# Patient Record
Sex: FEMALE | Race: WHITE | NOT HISPANIC OR LATINO | Employment: OTHER | URBAN - METROPOLITAN AREA
[De-identification: names, ages, dates, MRNs, and addresses within clinical notes are randomized per-mention and may not be internally consistent; named-entity substitution may affect disease eponyms.]

---

## 2021-05-18 ENCOUNTER — HOSPITAL ENCOUNTER (EMERGENCY)
Facility: HOSPITAL | Age: 71
Discharge: HOME | End: 2021-05-18
Attending: EMERGENCY MEDICINE
Payer: MEDICARE

## 2021-05-18 VITALS
OXYGEN SATURATION: 94 % | TEMPERATURE: 98 F | DIASTOLIC BLOOD PRESSURE: 65 MMHG | RESPIRATION RATE: 20 BRPM | WEIGHT: 285 LBS | HEART RATE: 20 BPM | BODY MASS INDEX: 52.13 KG/M2 | SYSTOLIC BLOOD PRESSURE: 150 MMHG

## 2021-05-18 DIAGNOSIS — R06.02 SOB (SHORTNESS OF BREATH): ICD-10-CM

## 2021-05-18 DIAGNOSIS — J44.1 COPD WITH ACUTE EXACERBATION: ICD-10-CM

## 2021-05-18 DIAGNOSIS — J18.9 PNEUMONIA OF RIGHT MIDDLE LOBE DUE TO INFECTIOUS ORGANISM: Primary | ICD-10-CM

## 2021-05-18 LAB
ALBUMIN SERPL BCP-MCNC: 3.1 G/DL (ref 3.5–5.2)
ALP SERPL-CCNC: 134 U/L (ref 55–135)
ALT SERPL W/O P-5'-P-CCNC: 14 U/L (ref 10–44)
ANION GAP SERPL CALC-SCNC: 14 MMOL/L (ref 8–16)
AST SERPL-CCNC: 12 U/L (ref 10–40)
BASOPHILS # BLD AUTO: 0.02 K/UL (ref 0–0.2)
BASOPHILS NFR BLD: 0.2 % (ref 0–1.9)
BILIRUB SERPL-MCNC: 0.3 MG/DL (ref 0.1–1)
BNP SERPL-MCNC: 23 PG/ML (ref 0–99)
BUN SERPL-MCNC: 17 MG/DL (ref 8–23)
CALCIUM SERPL-MCNC: 8.5 MG/DL (ref 8.7–10.5)
CHLORIDE SERPL-SCNC: 94 MMOL/L (ref 95–110)
CO2 SERPL-SCNC: 23 MMOL/L (ref 23–29)
CREAT SERPL-MCNC: 1.6 MG/DL (ref 0.5–1.4)
CTP QC/QA: YES
DIFFERENTIAL METHOD: ABNORMAL
EOSINOPHIL # BLD AUTO: 0.2 K/UL (ref 0–0.5)
EOSINOPHIL NFR BLD: 1.9 % (ref 0–8)
ERYTHROCYTE [DISTWIDTH] IN BLOOD BY AUTOMATED COUNT: 13.2 % (ref 11.5–14.5)
EST. GFR  (AFRICAN AMERICAN): 37.6 ML/MIN/1.73 M^2
EST. GFR  (NON AFRICAN AMERICAN): 32.6 ML/MIN/1.73 M^2
GLUCOSE SERPL-MCNC: 352 MG/DL (ref 70–110)
HCT VFR BLD AUTO: 37.2 % (ref 37–48.5)
HGB BLD-MCNC: 12.2 G/DL (ref 12–16)
IMM GRANULOCYTES # BLD AUTO: 0.05 K/UL (ref 0–0.04)
IMM GRANULOCYTES NFR BLD AUTO: 0.5 % (ref 0–0.5)
LYMPHOCYTES # BLD AUTO: 1.3 K/UL (ref 1–4.8)
LYMPHOCYTES NFR BLD: 12.2 % (ref 18–48)
MCH RBC QN AUTO: 33.5 PG (ref 27–31)
MCHC RBC AUTO-ENTMCNC: 32.8 G/DL (ref 32–36)
MCV RBC AUTO: 102 FL (ref 82–98)
MONOCYTES # BLD AUTO: 0.6 K/UL (ref 0.3–1)
MONOCYTES NFR BLD: 5.5 % (ref 4–15)
NEUTROPHILS # BLD AUTO: 8.2 K/UL (ref 1.8–7.7)
NEUTROPHILS NFR BLD: 79.7 % (ref 38–73)
NRBC BLD-RTO: 0 /100 WBC
PLATELET # BLD AUTO: 198 K/UL (ref 150–450)
PMV BLD AUTO: 9.8 FL (ref 9.2–12.9)
POCT GLUCOSE: 361 MG/DL (ref 70–110)
POCT GLUCOSE: 377 MG/DL (ref 70–110)
POTASSIUM SERPL-SCNC: 4.9 MMOL/L (ref 3.5–5.1)
PROT SERPL-MCNC: 7.3 G/DL (ref 6–8.4)
RBC # BLD AUTO: 3.64 M/UL (ref 4–5.4)
SARS-COV-2 RDRP RESP QL NAA+PROBE: NEGATIVE
SODIUM SERPL-SCNC: 131 MMOL/L (ref 136–145)
TROPONIN I SERPL DL<=0.01 NG/ML-MCNC: 0.02 NG/ML (ref 0–0.03)
TROPONIN I SERPL DL<=0.01 NG/ML-MCNC: 0.02 NG/ML (ref 0–0.03)
WBC # BLD AUTO: 10.25 K/UL (ref 3.9–12.7)

## 2021-05-18 PROCEDURE — 84484 ASSAY OF TROPONIN QUANT: CPT | Mod: ER | Performed by: EMERGENCY MEDICINE

## 2021-05-18 PROCEDURE — 83880 ASSAY OF NATRIURETIC PEPTIDE: CPT | Mod: ER | Performed by: EMERGENCY MEDICINE

## 2021-05-18 PROCEDURE — 93010 ELECTROCARDIOGRAM REPORT: CPT | Mod: ,,, | Performed by: INTERNAL MEDICINE

## 2021-05-18 PROCEDURE — 99285 EMERGENCY DEPT VISIT HI MDM: CPT | Mod: 25,ER

## 2021-05-18 PROCEDURE — 93005 ELECTROCARDIOGRAM TRACING: CPT | Mod: ER

## 2021-05-18 PROCEDURE — 96365 THER/PROPH/DIAG IV INF INIT: CPT | Mod: ER

## 2021-05-18 PROCEDURE — 27000221 HC OXYGEN, UP TO 24 HOURS: Mod: ER

## 2021-05-18 PROCEDURE — 25000003 PHARM REV CODE 250: Mod: ER | Performed by: EMERGENCY MEDICINE

## 2021-05-18 PROCEDURE — 63600175 PHARM REV CODE 636 W HCPCS: Mod: ER | Performed by: EMERGENCY MEDICINE

## 2021-05-18 PROCEDURE — 96372 THER/PROPH/DIAG INJ SC/IM: CPT | Mod: 59,ER

## 2021-05-18 PROCEDURE — U0002 COVID-19 LAB TEST NON-CDC: HCPCS | Mod: ER | Performed by: EMERGENCY MEDICINE

## 2021-05-18 PROCEDURE — 87040 BLOOD CULTURE FOR BACTERIA: CPT | Mod: 59 | Performed by: EMERGENCY MEDICINE

## 2021-05-18 PROCEDURE — 94640 AIRWAY INHALATION TREATMENT: CPT | Mod: ER

## 2021-05-18 PROCEDURE — 85025 COMPLETE CBC W/AUTO DIFF WBC: CPT | Mod: ER | Performed by: EMERGENCY MEDICINE

## 2021-05-18 PROCEDURE — 25000242 PHARM REV CODE 250 ALT 637 W/ HCPCS: Mod: ER | Performed by: EMERGENCY MEDICINE

## 2021-05-18 PROCEDURE — 82962 GLUCOSE BLOOD TEST: CPT | Mod: ER

## 2021-05-18 PROCEDURE — 93010 EKG 12-LEAD: ICD-10-PCS | Mod: ,,, | Performed by: INTERNAL MEDICINE

## 2021-05-18 PROCEDURE — 80053 COMPREHEN METABOLIC PANEL: CPT | Mod: ER | Performed by: EMERGENCY MEDICINE

## 2021-05-18 RX ORDER — AZITHROMYCIN 250 MG/1
TABLET, FILM COATED ORAL
Qty: 6 TABLET | Refills: 0 | Status: SHIPPED | OUTPATIENT
Start: 2021-05-18

## 2021-05-18 RX ORDER — IPRATROPIUM BROMIDE AND ALBUTEROL SULFATE 2.5; .5 MG/3ML; MG/3ML
3 SOLUTION RESPIRATORY (INHALATION)
Status: COMPLETED | OUTPATIENT
Start: 2021-05-18 | End: 2021-05-18

## 2021-05-18 RX ORDER — AMOXICILLIN AND CLAVULANATE POTASSIUM 875; 125 MG/1; MG/1
1 TABLET, FILM COATED ORAL 2 TIMES DAILY
Qty: 14 TABLET | Refills: 0 | Status: SHIPPED | OUTPATIENT
Start: 2021-05-18

## 2021-05-18 RX ORDER — INSULIN ASPART 100 [IU]/ML
8 INJECTION, SOLUTION INTRAVENOUS; SUBCUTANEOUS
Status: COMPLETED | OUTPATIENT
Start: 2021-05-18 | End: 2021-05-18

## 2021-05-18 RX ADMIN — IPRATROPIUM BROMIDE AND ALBUTEROL SULFATE 3 ML: .5; 3 SOLUTION RESPIRATORY (INHALATION) at 04:05

## 2021-05-18 RX ADMIN — INSULIN ASPART 8 UNITS: 100 INJECTION, SOLUTION INTRAVENOUS; SUBCUTANEOUS at 06:05

## 2021-05-18 RX ADMIN — CEFTRIAXONE 1 G: 1 INJECTION, SOLUTION INTRAVENOUS at 06:05

## 2021-05-18 RX ADMIN — SODIUM CHLORIDE 250 ML: 0.9 INJECTION, SOLUTION INTRAVENOUS at 06:05

## 2021-05-24 LAB
BACTERIA BLD CULT: NORMAL
BACTERIA BLD CULT: NORMAL

## 2021-06-10 DIAGNOSIS — L97.412 DIABETIC ULCER OF RIGHT HEEL ASSOCIATED WITH TYPE 2 DIABETES MELLITUS, WITH FAT LAYER EXPOSED: Primary | ICD-10-CM

## 2021-06-10 DIAGNOSIS — E11.621 DIABETIC ULCER OF RIGHT HEEL ASSOCIATED WITH TYPE 2 DIABETES MELLITUS, WITH FAT LAYER EXPOSED: Primary | ICD-10-CM

## 2021-07-06 ENCOUNTER — LAB VISIT (OUTPATIENT)
Dept: LAB | Facility: HOSPITAL | Age: 71
End: 2021-07-06
Attending: INTERNAL MEDICINE
Payer: MEDICARE

## 2021-07-06 DIAGNOSIS — L02.93: Primary | ICD-10-CM

## 2021-07-06 PROCEDURE — 87070 CULTURE OTHR SPECIMN AEROBIC: CPT | Performed by: INTERNAL MEDICINE

## 2021-07-10 LAB — BACTERIA SPEC AEROBE CULT: NO GROWTH

## 2023-10-03 ENCOUNTER — HOSPITAL ENCOUNTER (OUTPATIENT)
Dept: RADIOLOGY | Facility: HOSPITAL | Age: 73
Discharge: HOME | End: 2023-10-03
Attending: INTERNAL MEDICINE
Payer: MEDICARE

## 2023-10-03 DIAGNOSIS — M79.604 RIGHT LEG PAIN: Primary | ICD-10-CM

## 2023-10-03 DIAGNOSIS — M79.604 RIGHT LEG PAIN: ICD-10-CM

## 2023-10-03 PROCEDURE — 93971 EXTREMITY STUDY: CPT | Mod: TC,PO,RT

## 2023-10-03 PROCEDURE — 93971 EXTREMITY STUDY: CPT | Mod: 26,RT,, | Performed by: RADIOLOGY

## 2023-10-03 PROCEDURE — 93971 US LOWER EXTREMITY VEINS RIGHT: ICD-10-PCS | Mod: 26,RT,, | Performed by: RADIOLOGY

## 2024-08-16 ENCOUNTER — HOSPITAL ENCOUNTER (INPATIENT)
Facility: HOSPITAL | Age: 74
LOS: 3 days | Discharge: HOME | DRG: 178 | End: 2024-08-19
Attending: EMERGENCY MEDICINE | Admitting: INTERNAL MEDICINE
Payer: MEDICARE

## 2024-08-16 DIAGNOSIS — U07.1 COVID-19: Primary | ICD-10-CM

## 2024-08-16 DIAGNOSIS — R06.02 SOB (SHORTNESS OF BREATH): ICD-10-CM

## 2024-08-16 DIAGNOSIS — J44.9 CHRONIC OBSTRUCTIVE PULMONARY DISEASE, UNSPECIFIED COPD TYPE: Chronic | ICD-10-CM

## 2024-08-16 DIAGNOSIS — I10 HYPERTENSION, UNSPECIFIED TYPE: ICD-10-CM

## 2024-08-16 DIAGNOSIS — N17.9 AKI (ACUTE KIDNEY INJURY): ICD-10-CM

## 2024-08-16 DIAGNOSIS — R79.89 ELEVATED TROPONIN: ICD-10-CM

## 2024-08-16 DIAGNOSIS — J96.11 CHRONIC RESPIRATORY FAILURE WITH HYPOXIA: ICD-10-CM

## 2024-08-16 DIAGNOSIS — F17.200 TOBACCO DEPENDENCY: ICD-10-CM

## 2024-08-16 DIAGNOSIS — Z79.4 TYPE 2 DIABETES MELLITUS WITHOUT COMPLICATION, WITH LONG-TERM CURRENT USE OF INSULIN: ICD-10-CM

## 2024-08-16 DIAGNOSIS — E86.0 DEHYDRATION: ICD-10-CM
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
## 2024-08-16 DIAGNOSIS — E11.9 TYPE 2 DIABETES MELLITUS WITHOUT COMPLICATION, WITH LONG-TERM CURRENT USE OF INSULIN: ICD-10-CM

## 2024-08-16 LAB
ALBUMIN SERPL BCP-MCNC: 3.1 G/DL (ref 3.5–5.2)
ALP SERPL-CCNC: 108 U/L (ref 55–135)
ALT SERPL W/O P-5'-P-CCNC: 17 U/L (ref 10–44)
ANION GAP SERPL CALC-SCNC: 10 MMOL/L (ref 8–16)
AST SERPL-CCNC: 27 U/L (ref 10–40)
BASOPHILS # BLD AUTO: 0.03 K/UL (ref 0–0.2)
BASOPHILS NFR BLD: 0.4 % (ref 0–1.9)
BILIRUB SERPL-MCNC: 0.2 MG/DL (ref 0.1–1)
BUN SERPL-MCNC: 31 MG/DL (ref 8–23)
CALCIUM SERPL-MCNC: 8.5 MG/DL (ref 8.7–10.5)
CHLORIDE SERPL-SCNC: 101 MMOL/L (ref 95–110)
CK SERPL-CCNC: 703 U/L (ref 20–180)
CO2 SERPL-SCNC: 22 MMOL/L (ref 23–29)
CREAT SERPL-MCNC: 2 MG/DL (ref 0.5–1.4)
CRP SERPL-MCNC: 32 MG/L (ref 0–8.2)
CTP QC/QA: YES
DIFFERENTIAL METHOD BLD: ABNORMAL
EOSINOPHIL # BLD AUTO: 0 K/UL (ref 0–0.5)
EOSINOPHIL NFR BLD: 0.2 % (ref 0–8)
ERYTHROCYTE [DISTWIDTH] IN BLOOD BY AUTOMATED COUNT: 13.6 % (ref 11.5–14.5)
EST. GFR  (NO RACE VARIABLE): 26.1 ML/MIN/1.73 M^2
GLUCOSE SERPL-MCNC: 137 MG/DL (ref 70–110)
HCT VFR BLD AUTO: 31.5 % (ref 37–48.5)
HGB BLD-MCNC: 10.4 G/DL (ref 12–16)
IMM GRANULOCYTES # BLD AUTO: 0.05 K/UL (ref 0–0.04)
IMM GRANULOCYTES NFR BLD AUTO: 0.6 % (ref 0–0.5)
LACTATE SERPL-SCNC: 1.3 MMOL/L (ref 0.5–2.2)
LDH SERPL L TO P-CCNC: 260 U/L (ref 110–260)
LYMPHOCYTES # BLD AUTO: 0.7 K/UL (ref 1–4.8)
LYMPHOCYTES NFR BLD: 8.1 % (ref 18–48)
MCH RBC QN AUTO: 29.1 PG (ref 27–31)
MCHC RBC AUTO-ENTMCNC: 33 G/DL (ref 32–36)
MCV RBC AUTO: 88 FL (ref 82–98)
MONOCYTES # BLD AUTO: 0.9 K/UL (ref 0.3–1)
MONOCYTES NFR BLD: 11.4 % (ref 4–15)
NEUTROPHILS # BLD AUTO: 6.4 K/UL (ref 1.8–7.7)
NEUTROPHILS NFR BLD: 79.3 % (ref 38–73)
NRBC BLD-RTO: 0 /100 WBC
PLATELET # BLD AUTO: 266 K/UL (ref 150–450)
PMV BLD AUTO: 9.2 FL (ref 9.2–12.9)
POTASSIUM SERPL-SCNC: 5 MMOL/L (ref 3.5–5.1)
PROCALCITONIN SERPL IA-MCNC: 0.43 NG/ML
PROT SERPL-MCNC: 6.5 G/DL (ref 6–8.4)
RBC # BLD AUTO: 3.57 M/UL (ref 4–5.4)
SARS-COV-2 RDRP RESP QL NAA+PROBE: POSITIVE
SODIUM SERPL-SCNC: 133 MMOL/L (ref 136–145)
TROPONIN I SERPL DL<=0.01 NG/ML-MCNC: 0.03 NG/ML (ref 0–0.03)
WBC # BLD AUTO: 8.01 K/UL (ref 3.9–12.7)

## 2024-08-16 PROCEDURE — 27000207 HC ISOLATION

## 2024-08-16 PROCEDURE — 82728 ASSAY OF FERRITIN: CPT | Performed by: EMERGENCY MEDICINE

## 2024-08-16 PROCEDURE — 99285 EMERGENCY DEPT VISIT HI MDM: CPT | Mod: 25,ER

## 2024-08-16 PROCEDURE — 93010 ELECTROCARDIOGRAM REPORT: CPT | Mod: ,,, | Performed by: INTERNAL MEDICINE

## 2024-08-16 PROCEDURE — 84484 ASSAY OF TROPONIN QUANT: CPT | Mod: ER | Performed by: EMERGENCY MEDICINE

## 2024-08-16 PROCEDURE — 80053 COMPREHEN METABOLIC PANEL: CPT | Mod: ER | Performed by: EMERGENCY MEDICINE

## 2024-08-16 PROCEDURE — 93005 ELECTROCARDIOGRAM TRACING: CPT | Mod: ER

## 2024-08-16 PROCEDURE — 87040 BLOOD CULTURE FOR BACTERIA: CPT | Performed by: EMERGENCY MEDICINE

## 2024-08-16 PROCEDURE — 27000221 HC OXYGEN, UP TO 24 HOURS: Mod: ER

## 2024-08-16 PROCEDURE — 82550 ASSAY OF CK (CPK): CPT | Mod: ER | Performed by: EMERGENCY MEDICINE

## 2024-08-16 PROCEDURE — 87389 HIV-1 AG W/HIV-1&-2 AB AG IA: CPT | Performed by: EMERGENCY MEDICINE

## 2024-08-16 PROCEDURE — 86803 HEPATITIS C AB TEST: CPT | Performed by: EMERGENCY MEDICINE

## 2024-08-16 PROCEDURE — 87635 SARS-COV-2 COVID-19 AMP PRB: CPT | Mod: ER | Performed by: EMERGENCY MEDICINE

## 2024-08-16 PROCEDURE — 83605 ASSAY OF LACTIC ACID: CPT | Mod: ER | Performed by: EMERGENCY MEDICINE

## 2024-08-16 PROCEDURE — 83615 LACTATE (LD) (LDH) ENZYME: CPT | Mod: ER | Performed by: EMERGENCY MEDICINE

## 2024-08-16 PROCEDURE — 86140 C-REACTIVE PROTEIN: CPT | Mod: ER | Performed by: EMERGENCY MEDICINE

## 2024-08-16 PROCEDURE — 84145 PROCALCITONIN (PCT): CPT | Mod: ER | Performed by: EMERGENCY MEDICINE

## 2024-08-16 PROCEDURE — 25000003 PHARM REV CODE 250: Mod: ER | Performed by: EMERGENCY MEDICINE

## 2024-08-16 PROCEDURE — 85025 COMPLETE CBC W/AUTO DIFF WBC: CPT | Mod: ER | Performed by: EMERGENCY MEDICINE

## 2024-08-16 PROCEDURE — 21400001 HC TELEMETRY ROOM

## 2024-08-16 RX ORDER — SODIUM CHLORIDE 9 MG/ML
1000 INJECTION, SOLUTION INTRAVENOUS
Status: COMPLETED | OUTPATIENT
Start: 2024-08-16 | End: 2024-08-17

## 2024-08-16 RX ADMIN — SODIUM CHLORIDE 1000 ML: 9 INJECTION, SOLUTION INTRAVENOUS at 11:08

## 2024-08-16 RX ADMIN — SODIUM CHLORIDE 500 ML: 9 INJECTION, SOLUTION INTRAVENOUS at 09:08

## 2024-08-16 NOTE — Clinical Note
Diagnosis: COVID-19 [[id number]]   Reason for IP Medical Treatment  (Clinical interventions that can only be accomplished in the IP setting? ) :: IVFs   Plans for Post-Acute care--if anticipated (pick the single best option):: A. No post acute care anticipated at this time   Special Needs:: No Special Needs [1]

## 2024-08-17 PROBLEM — J12.82 PNEUMONIA DUE TO COVID-19 VIRUS: Status: ACTIVE | Noted: 2024-08-17

## 2024-08-17 PROBLEM — J44.9 COPD (CHRONIC OBSTRUCTIVE PULMONARY DISEASE): Chronic | Status: ACTIVE | Noted: 2024-08-17

## 2024-08-17 PROBLEM — F17.200 TOBACCO DEPENDENCY: Status: ACTIVE | Noted: 2024-08-17

## 2024-08-17 PROBLEM — J96.10 CHRONIC RESPIRATORY FAILURE: Status: ACTIVE | Noted: 2024-08-17

## 2024-08-17 PROBLEM — U07.1 PNEUMONIA DUE TO COVID-19 VIRUS: Status: ACTIVE | Noted: 2024-08-17

## 2024-08-17 PROBLEM — R79.89 ELEVATED TROPONIN: Status: ACTIVE | Noted: 2024-08-17

## 2024-08-17 LAB
ANION GAP SERPL CALC-SCNC: 8 MMOL/L (ref 8–16)
BACTERIA #/AREA URNS AUTO: NORMAL /HPF
BILIRUB UR QL STRIP: NEGATIVE
BUN SERPL-MCNC: 28 MG/DL (ref 8–23)
CALCIUM SERPL-MCNC: 8.6 MG/DL (ref 8.7–10.5)
CHLORIDE SERPL-SCNC: 105 MMOL/L (ref 95–110)
CLARITY UR REFRACT.AUTO: CLEAR
CO2 SERPL-SCNC: 25 MMOL/L (ref 23–29)
COLOR UR AUTO: YELLOW
CREAT SERPL-MCNC: 1.9 MG/DL (ref 0.5–1.4)
D DIMER PPP IA.FEU-MCNC: 2.23 MG/L FEU
ERYTHROCYTE [SEDIMENTATION RATE] IN BLOOD BY WESTERGREN METHOD: 65 MM/HR (ref 0–20)
EST. GFR  (NO RACE VARIABLE): 27.7 ML/MIN/1.73 M^2
ESTIMATED AVG GLUCOSE: 194 MG/DL (ref 68–131)
FERRITIN SERPL-MCNC: 91 NG/ML (ref 20–300)
GLUCOSE SERPL-MCNC: 74 MG/DL (ref 70–110)
GLUCOSE UR QL STRIP: NEGATIVE
HBA1C MFR BLD: 8.4 % (ref 4–5.6)
HCV AB SERPL QL IA: NEGATIVE
HEP C VIRUS HOLD SPECIMEN: NORMAL
HGB UR QL STRIP: ABNORMAL
HIV 1+2 AB+HIV1 P24 AG SERPL QL IA: NEGATIVE
HYALINE CASTS UR QL AUTO: 0 /LPF
KETONES UR QL STRIP: NEGATIVE
LEUKOCYTE ESTERASE UR QL STRIP: NEGATIVE
MICROSCOPIC COMMENT: NORMAL
NITRITE UR QL STRIP: NEGATIVE
OHS QRS DURATION: 92 MS
OHS QTC CALCULATION: 432 MS
PH UR STRIP: 6 [PH] (ref 5–8)
POCT GLUCOSE: 149 MG/DL (ref 70–110)
POCT GLUCOSE: 71 MG/DL (ref 70–110)
POCT GLUCOSE: 86 MG/DL (ref 70–110)
POTASSIUM SERPL-SCNC: 4.3 MMOL/L (ref 3.5–5.1)
PROT UR QL STRIP: ABNORMAL
RBC #/AREA URNS AUTO: 1 /HPF (ref 0–4)
SODIUM SERPL-SCNC: 138 MMOL/L (ref 136–145)
SP GR UR STRIP: 1.01 (ref 1–1.03)
SQUAMOUS #/AREA URNS AUTO: 1 /HPF
TROPONIN I SERPL DL<=0.01 NG/ML-MCNC: 0.02 NG/ML (ref 0–0.03)
TROPONIN I SERPL DL<=0.01 NG/ML-MCNC: 0.03 NG/ML (ref 0–0.03)
TROPONIN I SERPL DL<=0.01 NG/ML-MCNC: 0.03 NG/ML (ref 0–0.03)
URN SPEC COLLECT METH UR: ABNORMAL
UROBILINOGEN UR STRIP-ACNC: NEGATIVE EU/DL
WBC #/AREA URNS AUTO: 1 /HPF (ref 0–5)

## 2024-08-17 PROCEDURE — 84484 ASSAY OF TROPONIN QUANT: CPT | Mod: 91 | Performed by: NURSE PRACTITIONER

## 2024-08-17 PROCEDURE — 63600175 PHARM REV CODE 636 W HCPCS: Mod: JZ,TB | Performed by: HOSPITALIST

## 2024-08-17 PROCEDURE — 27000221 HC OXYGEN, UP TO 24 HOURS: Mod: ER

## 2024-08-17 PROCEDURE — 25000003 PHARM REV CODE 250: Performed by: HOSPITALIST

## 2024-08-17 PROCEDURE — 85379 FIBRIN DEGRADATION QUANT: CPT | Performed by: HOSPITALIST

## 2024-08-17 PROCEDURE — 36415 COLL VENOUS BLD VENIPUNCTURE: CPT | Performed by: NURSE PRACTITIONER

## 2024-08-17 PROCEDURE — 94640 AIRWAY INHALATION TREATMENT: CPT

## 2024-08-17 PROCEDURE — 80048 BASIC METABOLIC PNL TOTAL CA: CPT | Mod: ER | Performed by: EMERGENCY MEDICINE

## 2024-08-17 PROCEDURE — 25000242 PHARM REV CODE 250 ALT 637 W/ HCPCS: Performed by: HOSPITALIST

## 2024-08-17 PROCEDURE — 25000003 PHARM REV CODE 250: Mod: ER | Performed by: EMERGENCY MEDICINE

## 2024-08-17 PROCEDURE — 63600175 PHARM REV CODE 636 W HCPCS: Performed by: NURSE PRACTITIONER

## 2024-08-17 PROCEDURE — 36415 COLL VENOUS BLD VENIPUNCTURE: CPT | Performed by: HOSPITALIST

## 2024-08-17 PROCEDURE — 83036 HEMOGLOBIN GLYCOSYLATED A1C: CPT | Performed by: EMERGENCY MEDICINE

## 2024-08-17 PROCEDURE — 84484 ASSAY OF TROPONIN QUANT: CPT | Mod: ER | Performed by: EMERGENCY MEDICINE

## 2024-08-17 PROCEDURE — 27000207 HC ISOLATION

## 2024-08-17 PROCEDURE — 85651 RBC SED RATE NONAUTOMATED: CPT | Performed by: HOSPITALIST

## 2024-08-17 PROCEDURE — 84484 ASSAY OF TROPONIN QUANT: CPT | Mod: 91 | Performed by: HOSPITALIST

## 2024-08-17 PROCEDURE — 94761 N-INVAS EAR/PLS OXIMETRY MLT: CPT

## 2024-08-17 PROCEDURE — S4991 NICOTINE PATCH NONLEGEND: HCPCS | Mod: ER | Performed by: EMERGENCY MEDICINE

## 2024-08-17 PROCEDURE — XW033E5 INTRODUCTION OF REMDESIVIR ANTI-INFECTIVE INTO PERIPHERAL VEIN, PERCUTANEOUS APPROACH, NEW TECHNOLOGY GROUP 5: ICD-10-PCS | Performed by: INTERNAL MEDICINE

## 2024-08-17 PROCEDURE — 81000 URINALYSIS NONAUTO W/SCOPE: CPT | Mod: ER | Performed by: EMERGENCY MEDICINE

## 2024-08-17 PROCEDURE — 21400001 HC TELEMETRY ROOM

## 2024-08-17 PROCEDURE — 99900035 HC TECH TIME PER 15 MIN (STAT)

## 2024-08-17 RX ORDER — LEVOTHYROXINE SODIUM 100 UG/1
100 TABLET ORAL
Status: DISCONTINUED | OUTPATIENT
Start: 2024-08-17 | End: 2024-08-19 | Stop reason: HOSPADM

## 2024-08-17 RX ORDER — MUPIROCIN 20 MG/G
OINTMENT TOPICAL 2 TIMES DAILY
Status: DISCONTINUED | OUTPATIENT
Start: 2024-08-17 | End: 2024-08-19 | Stop reason: HOSPADM

## 2024-08-17 RX ORDER — ASPIRIN 81 MG/1
81 TABLET ORAL DAILY
Status: DISCONTINUED | OUTPATIENT
Start: 2024-08-17 | End: 2024-08-19 | Stop reason: HOSPADM

## 2024-08-17 RX ORDER — ONDANSETRON HYDROCHLORIDE 2 MG/ML
4 INJECTION, SOLUTION INTRAVENOUS EVERY 6 HOURS PRN
Status: DISCONTINUED | OUTPATIENT
Start: 2024-08-17 | End: 2024-08-19 | Stop reason: HOSPADM

## 2024-08-17 RX ORDER — GLUCAGON 1 MG
1 KIT INJECTION
Status: DISCONTINUED | OUTPATIENT
Start: 2024-08-17 | End: 2024-08-17 | Stop reason: SDUPTHER

## 2024-08-17 RX ORDER — GABAPENTIN 400 MG/1
400 CAPSULE ORAL NIGHTLY
Status: DISCONTINUED | OUTPATIENT
Start: 2024-08-17 | End: 2024-08-19 | Stop reason: HOSPADM

## 2024-08-17 RX ORDER — IBUPROFEN 200 MG
24 TABLET ORAL
Status: DISCONTINUED | OUTPATIENT
Start: 2024-08-17 | End: 2024-08-17 | Stop reason: SDUPTHER

## 2024-08-17 RX ORDER — IBUPROFEN 200 MG
16 TABLET ORAL
Status: DISCONTINUED | OUTPATIENT
Start: 2024-08-17 | End: 2024-08-19 | Stop reason: HOSPADM

## 2024-08-17 RX ORDER — BENZONATATE 100 MG/1
100 CAPSULE ORAL 3 TIMES DAILY PRN
Status: DISCONTINUED | OUTPATIENT
Start: 2024-08-17 | End: 2024-08-19 | Stop reason: HOSPADM

## 2024-08-17 RX ORDER — IBUPROFEN 200 MG
24 TABLET ORAL
Status: DISCONTINUED | OUTPATIENT
Start: 2024-08-17 | End: 2024-08-19 | Stop reason: HOSPADM

## 2024-08-17 RX ORDER — IPRATROPIUM BROMIDE AND ALBUTEROL SULFATE 2.5; .5 MG/3ML; MG/3ML
3 SOLUTION RESPIRATORY (INHALATION) EVERY 6 HOURS
Status: DISCONTINUED | OUTPATIENT
Start: 2024-08-17 | End: 2024-08-17

## 2024-08-17 RX ORDER — IPRATROPIUM BROMIDE AND ALBUTEROL SULFATE 2.5; .5 MG/3ML; MG/3ML
3 SOLUTION RESPIRATORY (INHALATION) EVERY 4 HOURS PRN
Status: DISCONTINUED | OUTPATIENT
Start: 2024-08-17 | End: 2024-08-17

## 2024-08-17 RX ORDER — IBUPROFEN 200 MG
1 TABLET ORAL DAILY
Status: DISCONTINUED | OUTPATIENT
Start: 2024-08-17 | End: 2024-08-17

## 2024-08-17 RX ORDER — TALC
6 POWDER (GRAM) TOPICAL NIGHTLY PRN
Status: DISCONTINUED | OUTPATIENT
Start: 2024-08-17 | End: 2024-08-19 | Stop reason: HOSPADM

## 2024-08-17 RX ORDER — INSULIN ASPART 100 [IU]/ML
0-10 INJECTION, SOLUTION INTRAVENOUS; SUBCUTANEOUS
Status: DISCONTINUED | OUTPATIENT
Start: 2024-08-17 | End: 2024-08-19 | Stop reason: HOSPADM

## 2024-08-17 RX ORDER — ACETAMINOPHEN 325 MG/1
650 TABLET ORAL EVERY 6 HOURS PRN
Status: DISCONTINUED | OUTPATIENT
Start: 2024-08-17 | End: 2024-08-17 | Stop reason: SDUPTHER

## 2024-08-17 RX ORDER — IPRATROPIUM BROMIDE AND ALBUTEROL SULFATE 2.5; .5 MG/3ML; MG/3ML
3 SOLUTION RESPIRATORY (INHALATION) EVERY 6 HOURS
Status: DISCONTINUED | OUTPATIENT
Start: 2024-08-17 | End: 2024-08-19 | Stop reason: HOSPADM

## 2024-08-17 RX ORDER — GLUCAGON 1 MG
1 KIT INJECTION
Status: DISCONTINUED | OUTPATIENT
Start: 2024-08-17 | End: 2024-08-19 | Stop reason: HOSPADM

## 2024-08-17 RX ORDER — IBUPROFEN 200 MG
1 TABLET ORAL
Status: COMPLETED | OUTPATIENT
Start: 2024-08-17 | End: 2024-08-18

## 2024-08-17 RX ORDER — ENOXAPARIN SODIUM 150 MG/ML
1 INJECTION SUBCUTANEOUS 2 TIMES DAILY
Status: DISCONTINUED | OUTPATIENT
Start: 2024-08-17 | End: 2024-08-19 | Stop reason: HOSPADM

## 2024-08-17 RX ORDER — ASCORBIC ACID 500 MG
500 TABLET ORAL 2 TIMES DAILY
Status: DISCONTINUED | OUTPATIENT
Start: 2024-08-17 | End: 2024-08-19 | Stop reason: HOSPADM

## 2024-08-17 RX ORDER — INSULIN ASPART 100 [IU]/ML
0-5 INJECTION, SOLUTION INTRAVENOUS; SUBCUTANEOUS
Status: DISCONTINUED | OUTPATIENT
Start: 2024-08-17 | End: 2024-08-17 | Stop reason: SDUPTHER

## 2024-08-17 RX ORDER — ACETAMINOPHEN 325 MG/1
650 TABLET ORAL EVERY 4 HOURS PRN
Status: DISCONTINUED | OUTPATIENT
Start: 2024-08-17 | End: 2024-08-19 | Stop reason: HOSPADM

## 2024-08-17 RX ORDER — SODIUM CHLORIDE 0.9 % (FLUSH) 0.9 %
10 SYRINGE (ML) INJECTION
Status: DISCONTINUED | OUTPATIENT
Start: 2024-08-17 | End: 2024-08-19 | Stop reason: HOSPADM

## 2024-08-17 RX ADMIN — REMDESIVIR 200 MG: 100 INJECTION, POWDER, LYOPHILIZED, FOR SOLUTION INTRAVENOUS at 05:08

## 2024-08-17 RX ADMIN — LEVOTHYROXINE SODIUM 100 MCG: 0.03 TABLET ORAL at 09:08

## 2024-08-17 RX ADMIN — ACETAMINOPHEN 650 MG: 325 TABLET ORAL at 09:08

## 2024-08-17 RX ADMIN — IPRATROPIUM BROMIDE AND ALBUTEROL SULFATE 3 ML: 2.5; .5 SOLUTION RESPIRATORY (INHALATION) at 07:08

## 2024-08-17 RX ADMIN — OXYCODONE HYDROCHLORIDE AND ACETAMINOPHEN 500 MG: 500 TABLET ORAL at 08:08

## 2024-08-17 RX ADMIN — MUPIROCIN: 20 OINTMENT TOPICAL at 08:08

## 2024-08-17 RX ADMIN — METHYLPREDNISOLONE SODIUM SUCCINATE 60 MG: 40 INJECTION, POWDER, FOR SOLUTION INTRAMUSCULAR; INTRAVENOUS at 05:08

## 2024-08-17 RX ADMIN — ACETAMINOPHEN 650 MG: 325 TABLET ORAL at 08:08

## 2024-08-17 RX ADMIN — ASPIRIN 81 MG: 81 TABLET, COATED ORAL at 09:08

## 2024-08-17 RX ADMIN — NICOTINE 1 PATCH: 21 PATCH, EXTENDED RELEASE TRANSDERMAL at 10:08

## 2024-08-17 RX ADMIN — MUPIROCIN: 20 OINTMENT TOPICAL at 09:08

## 2024-08-17 RX ADMIN — ENOXAPARIN SODIUM 135 MG: 150 INJECTION SUBCUTANEOUS at 08:08

## 2024-08-17 NOTE — ASSESSMENT & PLAN NOTE
Patient's FSGs are controlled on current medication regimen.  Last A1c reviewed-   Lab Results   Component Value Date    HGBA1C 8.4 (H) 08/17/2024     Most recent fingerstick glucose reviewed-   Recent Labs   Lab 08/17/24  0811 08/17/24  1608   POCTGLUCOSE 71 86     Current correctional scale  Moderate  Maintain anti-hyperglycemic dose as follows-   Antihyperglycemics (From admission, onward)      Start     Stop Route Frequency Ordered    08/17/24 1710  insulin aspart U-100 pen 0-10 Units  (Insulin - Moderate Correction Dose (recommended Total Daily Dose > 30 units))         -- SubQ Before meals & nightly PRN 08/17/24 1612          Hold Oral hypoglycemics while patient is in the hospital.

## 2024-08-17 NOTE — H&P
[facility] Medicine  History & Physical    Patient Name: Danielle Cortes  MRN: [id number]  Patient Class: IP- Inpatient  Admission Date: 2024  Attending Physician: Lori Garcia MD   Primary Care Provider: Derek Frank MD         Patient information was obtained from patient, relative(s), and ER records.     Subjective:     Principal Problem:COVID-19    Chief Complaint:   Chief Complaint   Patient presents with    COVID-19 Concerns     Patient was found on the floor this AM and has declined progressively throughout today; weakness, fever (101 axillary at home, given 1g of tylenol at home), positive home test, congestion; hx of COPD on home oxygen         HPI: Danielle Cortes is a 74 year old female with a PMHx of COPD, Tobacco abuse, Home oxygen use, OA, DM, HTN, Obesity, Thyroid disease, Spinal stenosis, and Glaucoma who presented to the [facility] ED with c/o generalized weakness. Associated symptoms include: fatigue, sneezing, nasal congestion. She was found down on floor this am from last night, unable to get up secondary to weakness. Daughter at bedside reports possible positive home COVID test today. ED workup: WBC count 8.01K, Hgb/Hct 10.4/34.5, Na+ 133, , creatinine 2.0, CRP 32, , lactic 1.3, procalcitonin 0.43, troponin 0.034, COVID positive. CT head and pelvis xray with no acute findings. CXR with no acute findings but showed cardiomegaly. PT admitted for generalized weakness r/t COVID.    Past Medical History:   Diagnosis Date    Arthritis     Asthma     Cataracts, bilateral     COPD (chronic obstructive pulmonary disease)     Diabetes mellitus     Glaucoma     Hypertension     Lumbar herniated disc     Morbid obesity     Renal disorder     Spinal stenosis     Thyroid disease        Past Surgical History:   Procedure Laterality Date     SECTION      CHOLECYSTECTOMY      COLOSTOMY         Review of patient's allergies indicates:   Allergen Reactions     "Adhesive     Hydromorphone     Meperidine     Phenobarbital     Tetracyclines        No current facility-administered medications on file prior to encounter.     Current Outpatient Medications on File Prior to Encounter   Medication Sig    albuterol (PROAIR HFA) 90 mcg/actuation inhaler Inhale 2 puffs into the lungs.    aspirin (ECOTRIN) 81 MG EC tablet Take 81 mg by mouth once daily.    blood sugar diagnostic (FREESTYLE LITE STRIPS) Strp Use as directed to test sugar readings 3 times a day    budesonide-formoterol 160-4.5 mcg (SYMBICORT) 160-4.5 mcg/actuation HFAA Inhale 2 puffs into the lungs 2 (two) times daily.    furosemide (LASIX) 40 MG tablet Take 40 mg by mouth 2 (two) times daily.    insulin aspart (NOVOLOG FLEXPEN) 100 unit/mL InPn pen once daily.    insulin glargine (LANTUS SOLOSTAR) 100 unit/mL (3 mL) InPn pen Inject 60 Units into the skin 2 (two) times daily. 60 units    insulin needles, disposable, (BD INSULIN PEN NEEDLE UF MINI) 31 x 3/16 " Ndle For use up to 5 times a day    lancets Misc Use as directed to test sugar reading 3 times a day    levothyroxine (SYNTHROID) 100 MCG tablet Take 100 mcg by mouth once daily.    nystatin (NYSTOP) powder continuous prn.    promethazine (PHENERGAN) 25 MG tablet Take 25 mg by mouth every 6 (six) hours as needed.    amoxicillin-clavulanate 875-125mg (AUGMENTIN) 875-125 mg per tablet Take 1 tablet by mouth 2 (two) times daily.    arformoterol (BROVANA) 15 mcg/2 mL Nebu 15 mcg 2 (two) times daily.    brimonidine 0.15 % OPTH DROP (ALPHAGAN) 0.15 % ophthalmic solution 2 (two) times daily.    gabapentin (NEURONTIN) 400 MG capsule Take 400 mg by mouth every evening. Take 3 at bed time    hydrocodone-acetaminophen 5-325mg (NORCO) 5-325 mg per tablet Take 1 tablet by mouth every 4 (four) hours as needed.    lidocaine (LIDODERM) 5 %(700 mg/patch) Place 1 patch onto the skin daily as needed.    timolol maleate 0.5% (TIMOPTIC) 0.5 % Drop every evening.    triamcinolone " acetonide 0.025% (KENALOG) 0.025 % Oint Apply topically continuous prn.    [DISCONTINUED] alprazolam (XANAX) 1 MG tablet Take 1 mg by mouth.    [DISCONTINUED] azithromycin (ZITHROMAX Z-KATHLEEN) 250 MG tablet 2 tabs po d #1, then 1 po d #2-5     Family History    None       Tobacco Use    Smoking status: Every Day     Current packs/day: 1.00     Types: Cigarettes    Smokeless tobacco: Never   Substance and Sexual Activity    Alcohol use: Yes    Drug use: No    Sexual activity: Never     Review of Systems   Constitutional:  Positive for activity change. Negative for chills and fever.   HENT:  Positive for congestion and sneezing. Negative for trouble swallowing.    Eyes:  Negative for visual disturbance.   Respiratory:  Positive for wheezing. Negative for cough and shortness of breath.    Cardiovascular:  Negative for chest pain.   Gastrointestinal:  Positive for nausea. Negative for abdominal pain, constipation, diarrhea and vomiting.   Genitourinary:  Negative for difficulty urinating.   Neurological:  Positive for weakness. Negative for seizures, speech difficulty, light-headedness, numbness and headaches.   Psychiatric/Behavioral:  Negative for agitation, behavioral problems and confusion.      Objective:     Vital Signs (Most Recent):  Temp: 98.6 °F (37 °C) (08/17/24 1449)  Pulse: 74 (08/17/24 1452)  Resp: (!) 24 (08/17/24 1449)  BP: (!) 143/63 (08/17/24 1449)  SpO2: 97 % (08/17/24 1449) Vital Signs (24h Range):  Temp:  [98.3 °F (36.8 °C)-98.7 °F (37.1 °C)] 98.6 °F (37 °C)  Pulse:  [72-89] 74  Resp:  [17-29] 24  SpO2:  [96 %-99 %] 97 %  BP: ()/(44-75) 143/63     Weight: 129.3 kg (285 lb)  Body mass index is 52.13 kg/m².     Physical Exam  Constitutional:       Appearance: She is obese. She is ill-appearing.   HENT:      Head: Normocephalic.   Eyes:      Extraocular Movements: Extraocular movements intact.   Cardiovascular:      Rate and Rhythm: Normal rate.      Pulses: Normal pulses.   Pulmonary:      Effort:  Pulmonary effort is normal.      Breath sounds: Wheezing present.   Abdominal:      General: Bowel sounds are normal. There is no distension.      Palpations: Abdomen is soft.      Tenderness: There is no abdominal tenderness.   Genitourinary:     Comments: Deferred  Musculoskeletal:      Cervical back: Normal range of motion.      Right lower leg: No edema.      Left lower leg: No edema.   Skin:     General: Skin is warm.      Capillary Refill: Capillary refill takes less than 2 seconds.   Neurological:      Mental Status: She is alert and oriented to person, place, and time.   Psychiatric:         Mood and Affect: Mood normal.         Behavior: Behavior normal.         Thought Content: Thought content normal.                Significant Labs: All pertinent labs within the past 24 hours have been reviewed.  BMP:   Recent Labs   Lab 08/17/24  0829   GLU 74      K 4.3      CO2 25   BUN 28*   CREATININE 1.9*   CALCIUM 8.6*     CBC:   Recent Labs   Lab 08/16/24  2150   WBC 8.01   HGB 10.4*   HCT 31.5*        Lactic Acid:   Recent Labs   Lab 08/16/24  2150   LACTATE 1.3     POCT Glucose:   Recent Labs   Lab 08/17/24  0811 08/17/24  1608   POCTGLUCOSE 71 86     Troponin:   Recent Labs   Lab 08/16/24  2232 08/17/24  0829   TROPONINI 0.034* 0.027*     Urine Studies:   Recent Labs   Lab 08/17/24  0443   COLORU Yellow   APPEARANCEUA Clear   PHUR 6.0   SPECGRAV 1.010   PROTEINUA 1+*   GLUCUA Negative   KETONESU Negative   BILIRUBINUA Negative   OCCULTUA 1+*   NITRITE Negative   UROBILINOGEN Negative   LEUKOCYTESUR Negative   RBCUA 1   WBCUA 1   BACTERIA Rare   SQUAMEPITHEL 1   HYALINECASTS 0       Significant Imaging:   Imaging Results              X-Ray Chest AP Portable (Final result)  Result time 08/16/24 22:43:11      Final result by Jonas Allen MD (08/16/24 22:43:11)                   Impression:      No acute abnormality.  Stable findings.      Electronically signed by: Jonas  Tiffany  Date:    08/16/2024  Time:    22:43               Narrative:    EXAMINATION:  XR CHEST AP PORTABLE    CLINICAL HISTORY:  COVID-19;    TECHNIQUE:  Single frontal view of the chest was performed.    COMPARISON:  None    FINDINGS:  Blunting of the left costophrenic angle similar to prior exam may be related to cardiomegaly and epicardial fat pad.  Retrocardiac opacity likely related to atelectasis.  Otherwise no pleural effusion or pneumothorax.    Cardiomegaly.  The hilar and mediastinal contours are unremarkable.    Bones are intact.                                       X-Ray Pelvis Routine AP (Final result)  Result time 08/16/24 22:44:23      Final result by Jonas Allen MD (08/16/24 22:44:23)                   Impression:      No acute fracture or dislocation senescent changes.  Degenerative joint disease.      Electronically signed by: Jonas Allen  Date:    08/16/2024  Time:    22:44               Narrative:    EXAMINATION:  XR PELVIS ROUTINE AP    CLINICAL HISTORY:  fall;    TECHNIQUE:  AP view of the pelvis was performed.    COMPARISON:  None.    FINDINGS:  No evidence for pelvic fracture.  Mild degenerative joint disease.  Decreased bone mineral density.  Senescent changes                                       CT Head Without Contrast (Final result)  Result time 08/16/24 22:42:16      Final result by Jonas Allen MD (08/16/24 22:42:16)                   Impression:      No acute abnormality.    Atrophy and chronic white matter changes    All CT scans   are performed using dose optimization techniques including the following: automated exposure control; adjustment of the mA and/or kV; use of iterative reconstruction technique.  Dose modulation was employed for ALARA by means of: Automated exposure control; adjustment of the mA and/or kV according to patient size (this includes techniques or standardized protocols for targeted exams where dose is matched to indication/reason for exam; i.e.  extremities or head); and/or use of iterative reconstructive technique.      Electronically signed by: Jonas Tiffany  Date:    08/16/2024  Time:    22:42               Narrative:    EXAMINATION:  CT HEAD WITHOUT CONTRAST    CLINICAL HISTORY:  Head trauma, minor (Age >= 65y);Syncope, recurrent; Unspecified fall, initial encounter    TECHNIQUE:  Low dose axial CT images obtained throughout the head without intravenous contrast. Sagittal and coronal reconstructions were performed.    COMPARISON:  None.    FINDINGS:  Atrophy and chronic white matter changes.    . No extra-axial blood or fluid collections.    No parenchymal mass, hemorrhage, edema or major vascular distribution infarct.    Skull/extracranial contents (limited evaluation): No fracture. Mastoid air cells and paranasal sinuses are essentially clear.                                     Assessment/Plan:     * COVID-19  Patient is identified as High risk for severe complications of COVID 19 based on COVID risk score of 6   Initiate standard COVID protocols; COVID-19 testing ,Infection Control notification  and isolation- respiratory, contact and droplet per protocol    Diagnostics: CBC, CMP, Procalcitonin, Ferritin, CRP, Troponin, and Portable CXR    Management: Initiate targeted therapy with Remdesivir, 200mg IV x1, followed by Solulmedrol 60 mg IV every 8 hours, Inhaled bronchodilators for shortness of breath, and Continuous cardiac monitoring. Continue supplemental oxygen at 2LPM.    Advance Care Planning Current advance care plan has not been discussed with patient/family/POA and patient currently wishes Full Code.     Elevated troponin  -Troponin 0.034>>0.027. Elevated likely due to COVID and COPD. Serial troponin pending. She currently denies chest pain. TTE.       Chronic respiratory failure  Patient with Hypercapnic Respiratory failure which is Acute on chronic.  she is on home oxygen at 2 LPM. Supplemental oxygen was provided and noted-      .    Signs/symptoms of respiratory failure include- tachypnea and wheezing. Contributing diagnoses includes - COPD and COVID  Labs and images were reviewed. Patient Has recent ABG, which has been reviewed. Will treat underlying causes and adjust management of respiratory failure as follows- IV steriods, scheduled DuoNebs, and Remdesivir.    COPD (chronic obstructive pulmonary disease)  Patient's COPD is uncontrolled currently. Likely r/t COVID. Patient is currently off COPD Pathway. Continue scheduled inhalers  continue home oxygen at 2LPM, Steroids, and Antibiotics and monitor respiratory status closely.     Tobacco dependency  Dangers of cigarette smoking were reviewed with patient in detail. Patient was Counseled for 3-10 minutes. Nicotine replacement options were discussed. Nicotine replacement was discussed- prescribed    Diabetes mellitus, type 2  Patient's FSGs are controlled on current medication regimen.  Last A1c reviewed-   Lab Results   Component Value Date    HGBA1C 8.4 (H) 08/17/2024     Most recent fingerstick glucose reviewed-   Recent Labs   Lab 08/17/24  0811 08/17/24  1608   POCTGLUCOSE 71 86     Current correctional scale  Moderate  Maintain anti-hyperglycemic dose as follows-   Antihyperglycemics (From admission, onward)      Start     Stop Route Frequency Ordered    08/17/24 1710  insulin aspart U-100 pen 0-10 Units  (Insulin - Moderate Correction Dose (recommended Total Daily Dose > 30 units))         -- SubQ Before meals & nightly PRN 08/17/24 1612          Hold Oral hypoglycemics while patient is in the hospital.    Adult hypothyroidism  Lab Results   Component Value Date    TSH 1.380 10/03/2023    TSH 1.700 06/08/2021      -Continue Levothyroxine    Extreme obesity  Body mass index is 52.13 kg/m². Morbid obesity complicates all aspects of disease management from diagnostic modalities to treatment. Weight loss encouraged and health benefits explained to patient.           VTE Risk Mitigation  (From admission, onward)           Ordered     enoxaparin injection 135 mg  2 times daily         08/17/24 7943                                VELMA Hebert  Department of Hospital Medicine  '[place] ([facility])

## 2024-08-17 NOTE — PLAN OF CARE
On 2L NC. Colostomy bag changed. Updated patient on plan of care. Instructed patient to use call light for assistance, call light in reach. Hourly rounding performed. Vitals q4 hours. Education provided, questions answered/encouraged. Chart check complete.     Problem: Adult Inpatient Plan of Care  Goal: Plan of Care Review  Outcome: Progressing  Goal: Patient-Specific Goal (Individualized)  Outcome: Progressing  Goal: Absence of Hospital-Acquired Illness or Injury  Outcome: Progressing  Goal: Optimal Comfort and Wellbeing  Outcome: Progressing  Goal: Readiness for Transition of Care  Outcome: Progressing     Problem: Diabetes Comorbidity  Goal: Blood Glucose Level Within Targeted Range  Outcome: Progressing     Problem: Skin Injury Risk Increased  Goal: Skin Health and Integrity  Outcome: Progressing     Problem: Bariatric Environmental Safety  Goal: Safety Maintained with Care  Outcome: Progressing

## 2024-08-17 NOTE — ASSESSMENT & PLAN NOTE
Lab Results   Component Value Date    TSH 1.380 10/03/2023    TSH 1.700 06/08/2021      -Continue Levothyroxine

## 2024-08-17 NOTE — PROVIDER PROGRESS NOTES - EMERGENCY DEPT.
Encounter Date: 8/16/2024    ED Physician Progress Notes        Physician Note:   .August 17, 2024  9:22 AM     Interval progress note;  patient boarded in emergency department, admitted to hospital medicine.  Currently awaiting room assignment.  Patient appears to be restless.  Daughter is at bedside.  Daughter states that patient started getting ill yesterday.  Home COVID test.  Patient normally ambulates with a walker.    -----------------------------------------------------            08/16/24 08/16/24 08/16/24 08/16/24               2300      2315      2330      2345     -----------------------------------------------------   BP:     (!) 122/52(!) 118/53  128/60  (!) 123/56  -----------------------------------------------------            08/17/24 08/17/24 08/17/24 08/17/24               0117      0224      0324      0424     -----------------------------------------------------   BP:     (!) 102/44  136/62  (!) 144/62  138/62    -----------------------------------------------------            08/17/24 08/17/24               0524      0740     ---------------------------   BP:     (!) 126/53(!) 138/57  ---------------------------      General: Patient appears to be somewhat restless.  Heart: Regular rate and rhythm  Lungs:  Clear to auscultation.  No rales rhonchi wheezes  Abdomen: Soft, no rebound, guarding, masses  Extremity: No calf pain, calf tenderness    Results for orders placed or performed during the hospital encounter of 08/16/24  -Blood culture:   Specimen: Peripheral, Antecubital, Right; Blood       Result                      Value             Ref Range           Blood Culture, Routine                                        No Growth to date  -Blood culture:   Specimen: Peripheral, Forearm, Left; Blood       Result                      Value             Ref Range           Blood Culture, Routine                                         No Growth to date  -HIV 1/2 Ag/Ab (4th Gen):        Result                      Value             Ref Range           HIV 1/2 Ag/Ab               Negative          Negative       -Hepatitis C Antibody:        Result                      Value             Ref Range           Hepatitis C Ab              Negative          Negative       -HCV Virus Hold Specimen:        Result                      Value             Ref Range           HEP C Virus Hold Speci*                                       Hold for HCV sendout  -CBC auto differential:        Result                      Value             Ref Range           WBC                         8.01              3.90 - 12.70*       RBC                         3.57 (L)          4.00 - 5.40 *       Hemoglobin                  10.4 (L)          12.0 - 16.0 *       Hematocrit                  31.5 (L)          37.0 - 48.5 %       MCV                         88                82 - 98 fL          MCH                         29.1              27.0 - 31.0 *       MCHC                        33.0              32.0 - 36.0 *       RDW                         13.6              11.5 - 14.5 %       Platelets                   266               150 - 450 K/*       MPV                         9.2               9.2 - 12.9 fL       Immature Granulocytes       0.6 (H)           0.0 - 0.5 %         Gran # (ANC)                6.4               1.8 - 7.7 K/*       Immature Grans (Abs)        0.05 (H)          0.00 - 0.04 *       Lymph #                     0.7 (L)           1.0 - 4.8 K/*       Mono #                      0.9               0.3 - 1.0 K/*       Eos #                       0.0               0.0 - 0.5 K/*       Baso #                      0.03              0.00 - 0.20 *       nRBC                        0                 0 /100 WBC          Gran %                      79.3 (H)          38.0 - 73.0 %       Lymph %                     8.1 (L)           18.0 - 48.0 %       Mono %                       11.4              4.0 - 15.0 %        Eosinophil %                0.2               0.0 - 8.0 %         Basophil %                  0.4               0.0 - 1.9 %         Differential Method         Automated                        -Comprehensive metabolic panel:        Result                      Value             Ref Range           Sodium                      133 (L)           136 - 145 mm*       Potassium                   5.0               3.5 - 5.1 mm*       Chloride                    101               95 - 110 mmo*       CO2                         22 (L)            23 - 29 mmol*       Glucose                     137 (H)           70 - 110 mg/*       BUN                         31 (H)            8 - 23 mg/dL        Creatinine                  2.0 (H)           0.5 - 1.4 mg*       Calcium                     8.5 (L)           8.7 - 10.5 m*       Total Protein               6.5               6.0 - 8.4 g/*       Albumin                     3.1 (L)           3.5 - 5.2 g/*       Total Bilirubin             0.2               0.1 - 1.0 mg*       Alkaline Phosphatase        108               55 - 135 U/L        AST                         27                10 - 40 U/L         ALT                         17                10 - 44 U/L         eGFR                        26.1 (A)          >60 mL/min/1*       Anion Gap                   10                8 - 16 mmol/L  -C-Reactive Protein:        Result                      Value             Ref Range           CRP                         32.0 (H)          0.0 - 8.2 mg*  -CK:        Result                      Value             Ref Range           CPK                         703 (H)           20 - 180 U/L   -Lactic Acid, Plasma:        Result                      Value             Ref Range           Lactate (Lactic Acid)       1.3               0.5 - 2.2 mm*  -Procalcitonin:        Result                      Value             Ref Range            Procalcitonin               0.43 (H)          <0.25 ng/mL    -Urinalysis, Reflex to Urine Culture Urine, Clean Catch:   Specimen: Urine       Result                      Value             Ref Range           Specimen UA                                                   Urine, Clean Catch       Color, UA                   Yellow            Yellow, Stra*       Appearance, UA              Clear             Clear               pH, UA                      6.0               5.0 - 8.0           Specific [place], UA        1.010             1.005 - 1.030       Protein, UA                 1+ (A)            Negative            Glucose, UA                 Negative          Negative            Ketones, UA                 Negative          Negative            Bilirubin (UA)              Negative          Negative            Occult Blood UA             1+ (A)            Negative            Nitrite, UA                 Negative          Negative            Urobilinogen, UA            Negative          <2.0 EU/dL          Leukocytes, UA              Negative          Negative       -Troponin I:        Result                      Value             Ref Range           Troponin I                  0.034 (H)         0.000 - 0.02*  -Ferritin:        Result                      Value             Ref Range           Ferritin                    91                20.0 - 300.0*  -Lactate dehydrogenase:        Result                      Value             Ref Range           LD                          260               110 - 260 U/L  -Urinalysis Microscopic:        Result                      Value             Ref Range           RBC, UA                     1                 0 - 4 /hpf          WBC, UA                     1                 0 - 5 /hpf          Bacteria                    Rare              None-Occ /hpf       Squam Epithel, UA           1                 /hpf                Hyaline Casts, UA           0                 0-1/lpf  /lpf        Microscopic Comment         SEE COMMENT                      -Troponin I:        Result                      Value             Ref Range           Troponin I                  0.027 (H)         0.000 - 0.02*  -Basic metabolic panel:        Result                      Value             Ref Range           Sodium                      138               136 - 145 mm*       Potassium                   4.3               3.5 - 5.1 mm*       Chloride                    105               95 - 110 mmo*       CO2                         25                23 - 29 mmol*       Glucose                     74                70 - 110 mg/*       BUN                         28 (H)            8 - 23 mg/dL        Creatinine                  1.9 (H)           0.5 - 1.4 mg*       Calcium                     8.6 (L)           8.7 - 10.5 m*       Anion Gap                   8                 8 - 16 mmol/L       eGFR                        27.7 (A)          >60 mL/min/1*  -POCT COVID-19 Rapid Screening:        Result                      Value             Ref Range           POC Rapid COVID             Positive (A)      Negative             Accept*     Yes                              -EKG 12-lead:        Result                      Value             Ref Range           QRS Duration                92                ms                  [facility] QTC Calculation         432               ms             -POCT glucose:        Result                      Value             Ref Range           POCT Glucose                71                70 - 110 mg/*      COVID positive.  Hemoglobin/hematocrit 10.4/31.5.  Lactic 1.3.  Procalcitonin 0.43.  Blood cultures negative to growth.  Chest x-ray no acute abnormality.  CT head shows atrophy and chronic white matter changes.    Problems:   1; diabetes:  Initiate renal diet.  Accu-Chek.  Sliding scale initiated  2. COPD:  DuoNebs  3.  COVID: Consideration for Paxlovid?  However GFR is  reduced and is not recommended in GFR is less than 30  4. Elevated troponin:  Trend troponins:  Flat.  Consider echo note unable to perform at freestanding emergency department  5.  Deconditioning: PT evaluation    Daughter updated currently awaiting on beds.  Message sent for Hospital Medicine to assist in further management.    Nona Chappell DO, FACEP

## 2024-08-17 NOTE — SUBJECTIVE & OBJECTIVE
"Past Medical History:   Diagnosis Date    Arthritis     Asthma     Cataracts, bilateral     COPD (chronic obstructive pulmonary disease)     Diabetes mellitus     Glaucoma     Hypertension     Lumbar herniated disc     Morbid obesity     Renal disorder     Spinal stenosis     Thyroid disease        Past Surgical History:   Procedure Laterality Date     SECTION      CHOLECYSTECTOMY      COLOSTOMY         Review of patient's allergies indicates:   Allergen Reactions    Adhesive     Hydromorphone     Meperidine     Phenobarbital     Tetracyclines        No current facility-administered medications on file prior to encounter.     Current Outpatient Medications on File Prior to Encounter   Medication Sig    albuterol (PROAIR HFA) 90 mcg/actuation inhaler Inhale 2 puffs into the lungs.    aspirin (ECOTRIN) 81 MG EC tablet Take 81 mg by mouth once daily.    blood sugar diagnostic (FREESTYLE LITE STRIPS) Strp Use as directed to test sugar readings 3 times a day    budesonide-formoterol 160-4.5 mcg (SYMBICORT) 160-4.5 mcg/actuation HFAA Inhale 2 puffs into the lungs 2 (two) times daily.    furosemide (LASIX) 40 MG tablet Take 40 mg by mouth 2 (two) times daily.    insulin aspart (NOVOLOG FLEXPEN) 100 unit/mL InPn pen once daily.    insulin glargine (LANTUS SOLOSTAR) 100 unit/mL (3 mL) InPn pen Inject 60 Units into the skin 2 (two) times daily. 60 units    insulin needles, disposable, (BD INSULIN PEN NEEDLE UF MINI) 31 x 3/16 " Ndle For use up to 5 times a day    lancets Misc Use as directed to test sugar reading 3 times a day    levothyroxine (SYNTHROID) 100 MCG tablet Take 100 mcg by mouth once daily.    nystatin (NYSTOP) powder continuous prn.    promethazine (PHENERGAN) 25 MG tablet Take 25 mg by mouth every 6 (six) hours as needed.    amoxicillin-clavulanate 875-125mg (AUGMENTIN) 875-125 mg per tablet Take 1 tablet by mouth 2 (two) times daily.    arformoterol (BROVANA) 15 mcg/2 mL Nebu 15 mcg 2 (two) times " daily.    brimonidine 0.15 % OPTH DROP (ALPHAGAN) 0.15 % ophthalmic solution 2 (two) times daily.    gabapentin (NEURONTIN) 400 MG capsule Take 400 mg by mouth every evening. Take 3 at bed time    hydrocodone-acetaminophen 5-325mg (NORCO) 5-325 mg per tablet Take 1 tablet by mouth every 4 (four) hours as needed.    lidocaine (LIDODERM) 5 %(700 mg/patch) Place 1 patch onto the skin daily as needed.    timolol maleate 0.5% (TIMOPTIC) 0.5 % Drop every evening.    triamcinolone acetonide 0.025% (KENALOG) 0.025 % Oint Apply topically continuous prn.    [DISCONTINUED] alprazolam (XANAX) 1 MG tablet Take 1 mg by mouth.    [DISCONTINUED] azithromycin (ZITHROMAX Z-KATHLEEN) 250 MG tablet 2 tabs po d #1, then 1 po d #2-5     Family History    None       Tobacco Use    Smoking status: Every Day     Current packs/day: 1.00     Types: Cigarettes    Smokeless tobacco: Never   Substance and Sexual Activity    Alcohol use: Yes    Drug use: No    Sexual activity: Never     Review of Systems   Constitutional:  Positive for activity change. Negative for chills and fever.   HENT:  Positive for congestion and sneezing. Negative for trouble swallowing.    Eyes:  Negative for visual disturbance.   Respiratory:  Positive for wheezing. Negative for cough and shortness of breath.    Cardiovascular:  Negative for chest pain.   Gastrointestinal:  Positive for nausea. Negative for abdominal pain, constipation, diarrhea and vomiting.   Genitourinary:  Negative for difficulty urinating.   Neurological:  Positive for weakness. Negative for seizures, speech difficulty, light-headedness, numbness and headaches.   Psychiatric/Behavioral:  Negative for agitation, behavioral problems and confusion.      Objective:     Vital Signs (Most Recent):  Temp: 98.6 °F (37 °C) (08/17/24 1449)  Pulse: 74 (08/17/24 1452)  Resp: (!) 24 (08/17/24 1449)  BP: (!) 143/63 (08/17/24 1449)  SpO2: 97 % (08/17/24 1449) Vital Signs (24h Range):  Temp:  [98.3 °F (36.8 °C)-98.7 °F  (37.1 °C)] 98.6 °F (37 °C)  Pulse:  [72-89] 74  Resp:  [17-29] 24  SpO2:  [96 %-99 %] 97 %  BP: ()/(44-75) 143/63     Weight: 129.3 kg (285 lb)  Body mass index is 52.13 kg/m².     Physical Exam  Constitutional:       Appearance: She is obese. She is ill-appearing.   HENT:      Head: Normocephalic.   Eyes:      Extraocular Movements: Extraocular movements intact.   Cardiovascular:      Rate and Rhythm: Normal rate.      Pulses: Normal pulses.   Pulmonary:      Effort: Pulmonary effort is normal.      Breath sounds: Wheezing present.   Abdominal:      General: Bowel sounds are normal. There is no distension.      Palpations: Abdomen is soft.      Tenderness: There is no abdominal tenderness.   Genitourinary:     Comments: Deferred  Musculoskeletal:      Cervical back: Normal range of motion.      Right lower leg: No edema.      Left lower leg: No edema.   Skin:     General: Skin is warm.      Capillary Refill: Capillary refill takes less than 2 seconds.   Neurological:      Mental Status: She is alert and oriented to person, place, and time.   Psychiatric:         Mood and Affect: Mood normal.         Behavior: Behavior normal.         Thought Content: Thought content normal.                Significant Labs: All pertinent labs within the past 24 hours have been reviewed.  BMP:   Recent Labs   Lab 08/17/24  0829   GLU 74      K 4.3      CO2 25   BUN 28*   CREATININE 1.9*   CALCIUM 8.6*     CBC:   Recent Labs   Lab 08/16/24  2150   WBC 8.01   HGB 10.4*   HCT 31.5*        Lactic Acid:   Recent Labs   Lab 08/16/24  2150   LACTATE 1.3     POCT Glucose:   Recent Labs   Lab 08/17/24  0811 08/17/24  1608   POCTGLUCOSE 71 86     Troponin:   Recent Labs   Lab 08/16/24  2232 08/17/24  0829   TROPONINI 0.034* 0.027*     Urine Studies:   Recent Labs   Lab 08/17/24  0443   COLORU Yellow   APPEARANCEUA Clear   PHUR 6.0   SPECGRAV 1.010   PROTEINUA 1+*   GLUCUA Negative   KETONESU Negative   BILIRUBINUA  Negative   OCCULTUA 1+*   NITRITE Negative   UROBILINOGEN Negative   LEUKOCYTESUR Negative   RBCUA 1   WBCUA 1   BACTERIA Rare   SQUAMEPITHEL 1   HYALINECASTS 0       Significant Imaging:   Imaging Results              X-Ray Chest AP Portable (Final result)  Result time 08/16/24 22:43:11      Final result by Jonas Allen MD (08/16/24 22:43:11)                   Impression:      No acute abnormality.  Stable findings.      Electronically signed by: Jonas Allen  Date:    08/16/2024  Time:    22:43               Narrative:    EXAMINATION:  XR CHEST AP PORTABLE    CLINICAL HISTORY:  COVID-19;    TECHNIQUE:  Single frontal view of the chest was performed.    COMPARISON:  None    FINDINGS:  Blunting of the left costophrenic angle similar to prior exam may be related to cardiomegaly and epicardial fat pad.  Retrocardiac opacity likely related to atelectasis.  Otherwise no pleural effusion or pneumothorax.    Cardiomegaly.  The hilar and mediastinal contours are unremarkable.    Bones are intact.                                       X-Ray Pelvis Routine AP (Final result)  Result time 08/16/24 22:44:23      Final result by Jonas Allen MD (08/16/24 22:44:23)                   Impression:      No acute fracture or dislocation senescent changes.  Degenerative joint disease.      Electronically signed by: Jonas Allen  Date:    08/16/2024  Time:    22:44               Narrative:    EXAMINATION:  XR PELVIS ROUTINE AP    CLINICAL HISTORY:  fall;    TECHNIQUE:  AP view of the pelvis was performed.    COMPARISON:  None.    FINDINGS:  No evidence for pelvic fracture.  Mild degenerative joint disease.  Decreased bone mineral density.  Senescent changes                                       CT Head Without Contrast (Final result)  Result time 08/16/24 22:42:16      Final result by Jonas Allen MD (08/16/24 22:42:16)                   Impression:      No acute abnormality.    Atrophy and chronic white matter changes    All  CT scans   are performed using dose optimization techniques including the following: automated exposure control; adjustment of the mA and/or kV; use of iterative reconstruction technique.  Dose modulation was employed for ALARA by means of: Automated exposure control; adjustment of the mA and/or kV according to patient size (this includes techniques or standardized protocols for targeted exams where dose is matched to indication/reason for exam; i.e. extremities or head); and/or use of iterative reconstructive technique.      Electronically signed by: Jonas Allen  Date:    08/16/2024  Time:    22:42               Narrative:    EXAMINATION:  CT HEAD WITHOUT CONTRAST    CLINICAL HISTORY:  Head trauma, minor (Age >= 65y);Syncope, recurrent; Unspecified fall, initial encounter    TECHNIQUE:  Low dose axial CT images obtained throughout the head without intravenous contrast. Sagittal and coronal reconstructions were performed.    COMPARISON:  None.    FINDINGS:  Atrophy and chronic white matter changes.    . No extra-axial blood or fluid collections.    No parenchymal mass, hemorrhage, edema or major vascular distribution infarct.    Skull/extracranial contents (limited evaluation): No fracture. Mastoid air cells and paranasal sinuses are essentially clear.

## 2024-08-17 NOTE — ASSESSMENT & PLAN NOTE
Body mass index is 52.13 kg/m². Morbid obesity complicates all aspects of disease management from diagnostic modalities to treatment. Weight loss encouraged and health benefits explained to patient.        Xerosis Normal Treatment: I recommended application of Cetaphil or CeraVe numerous times a day going to bed to all dry areas. Nosebleeds Normal Treatment: I explained this is common when taking isotretinoin. I recommended saline mist in each nostril multiple times a day. If this worsens they will contact us. Weight Units: pounds Xerosis Aggressive Treatment: I recommended application of Cetaphil or CeraVe numerous times a day and before going to bed to all dry areas. I also prescribed a topical steroid for twice daily use. Hypertriglyceridemia Treatment: I explained this is common when taking isotretinoin. If this worsens they will contact us. They may try OTC ibuprofen. Lower Range (In Mg/Kg): 120 Calculate Months Of Therapy Based On Documented Dosages (Will Hide Months Of Therapy Question)?: No Add Associated Diagnosis When Managing Medication Side Effects: Yes Target Cumulative Dosage (In Mg/Kg): 135 Cheilitis Normal Treatment: I recommended application of Vaseline or Aquaphor numerous times a day (as often as every hour) and before going to bed. Hypercholesterolemia Monitoring: I explained this is common when taking isotretinoin. We will monitor closely. Xerosis Aggressive Treatment: I recommended application of Cetaphil or CeraVe numerous times a day going to bed to all dry areas. I also prescribed a topical steroid for twice daily use. Cheilitis Aggressive Treatment: I recommended application of Vaseline or Aquaphor numerous times a day (as often as every hour) and before going to bed. I also prescribed a topical steroid for twice daily use. Hypertriglyceridemia Monitoring: I explained this is common when taking isotretinoin. If this worsens they will contact us. Retinoid Dermatitis Normal Treatment: I recommended more frequent application of Cetaphil or CeraVe to the areas of dermatitis. Upper Range (In Mg/Kg): 150 Headache Monitoring: I recommended monitoring the headaches for now. There is no evidence of increased intracranial pressure. They were instructed to call if the headaches are worsening. What Is The Patient's Gender: Female Female Pregnancy Counseling Text: Female patients should also be on two forms of birth control while taking this medication and for one month after their last dose. Kilograms Preamble Statement (Weight Entered In Details Tab): Reported Weight in kilograms: Months Of Therapy Completed: 1 Xerosis Normal Treatment: I recommended application of Cetaphil or CeraVe numerous times a day and before going to bed to all dry areas. Female Completion Statement: After discussing her treatment course we decided to discontinue isotretinoin therapy at this time. I explained that she would need to continue her birth control methods for at least one month after the last dosage. She should also get a pregnancy test one month after the last dose. She shouldn't donate blood for one month after the last dose. She should call with any new symptoms of depression. Counseling Text: I reviewed the side effect in detail. Patient should get monthly blood tests, not donate blood, not drive at night if vision affected, and not share medication. Pounds Preamble Statement (Weight Entered In Details Tab): Reported Weight in pounds: Retinoid Dermatitis Aggressive Treatment: I recommended more frequent application of Cetaphil or CeraVe to the areas of dermatitis. I also prescribed a topical steroid for twice daily use until the dermatitis resolves. Next Month's Dosage: Continue Current Dosage Male Completion Statement: After discussing his treatment course we decided to discontinue isotretinoin therapy at this time. He shouldn't donate blood for one month after the last dose. He should call with any new symptoms of depression. Use Therapeutic Ranged Or Therapeutic Target: please select Range or Target Detail Level: Zone

## 2024-08-17 NOTE — ASSESSMENT & PLAN NOTE
Patient's COPD is uncontrolled currently. Likely r/t COVID. Patient is currently off COPD Pathway. Continue scheduled inhalers  continue home oxygen at 2LPM, Steroids, and Antibiotics and monitor respiratory status closely.

## 2024-08-17 NOTE — ED PROVIDER NOTES
Encounter Date: 2024       History     Chief Complaint   Patient presents with    COVID-19 Concerns     Patient was found on the floor this AM and has declined progressively throughout today; weakness, fever (101 axillary at home, given 1g of tylenol at home), positive home test, congestion; hx of COPD on home oxygen      HPI  Pt with fatigue, weakness was found down on floor this am from last night, unable to get up secondary to weakness. Pt had possible positive home Copvid test today with sx of congestion and fever. Pt c hx of COPD on Home O2.    Review of patient's allergies indicates:   Allergen Reactions    Adhesive     Hydromorphone     Meperidine     Phenobarbital     Tetracyclines      Past Medical History:   Diagnosis Date    Arthritis     Asthma     Cataracts, bilateral     COPD (chronic obstructive pulmonary disease)     Diabetes mellitus     Glaucoma     Hypertension     Lumbar herniated disc     Morbid obesity     Renal disorder     Spinal stenosis     Thyroid disease      Past Surgical History:   Procedure Laterality Date     SECTION      CHOLECYSTECTOMY      COLOSTOMY       No family history on file.  Social History     Tobacco Use    Smoking status: Every Day     Current packs/day: 1.00     Types: Cigarettes    Smokeless tobacco: Never   Substance Use Topics    Alcohol use: Yes    Drug use: No     Review of Systems   Constitutional:  Positive for chills. Negative for fever.   HENT:  Negative for sore throat.    Respiratory:  Negative for shortness of breath.    Cardiovascular:  Negative for chest pain.   Gastrointestinal:  Negative for nausea.   Genitourinary:  Negative for dysuria.   Musculoskeletal:  Negative for back pain.   Skin:  Negative for rash.   Neurological:  Positive for dizziness and light-headedness. Negative for weakness.   Hematological:  Does not bruise/bleed easily.       Physical Exam     Initial Vitals   BP Pulse Resp Temp SpO2   24 2105 24 2055 24  2055 08/16/24 2055 08/16/24 2055   (!) 114/53 89 (!) 24 98.7 °F (37.1 °C) 96 %      MAP       --                Physical Exam    Nursing note and vitals reviewed.  Constitutional: She appears well-developed and well-nourished. No distress.   Elderly   HENT:   Head: Normocephalic and atraumatic.   Mouth/Throat: Oropharynx is clear and moist.   Eyes: Conjunctivae and EOM are normal. Pupils are equal, round, and reactive to light.   Neck: Neck supple.   Normal range of motion.  Cardiovascular:  Normal rate, regular rhythm and normal heart sounds.           Pulmonary/Chest: Breath sounds normal. No respiratory distress.   Abdominal: Abdomen is soft. Bowel sounds are normal. She exhibits no distension. There is no abdominal tenderness.   Musculoskeletal:         General: Normal range of motion.      Cervical back: Normal range of motion and neck supple.     Neurological: She is alert and oriented to person, place, and time. She has normal strength.   Skin: Skin is warm and dry.   Psychiatric: She has a normal mood and affect. Thought content normal.         ED Course   Procedures  Labs Reviewed   CBC W/ AUTO DIFFERENTIAL - Abnormal       Result Value    WBC 8.01      RBC 3.57 (*)     Hemoglobin 10.4 (*)     Hematocrit 31.5 (*)     MCV 88      MCH 29.1      MCHC 33.0      RDW 13.6      Platelets 266      MPV 9.2      Immature Granulocytes 0.6 (*)     Gran # (ANC) 6.4      Immature Grans (Abs) 0.05 (*)     Lymph # 0.7 (*)     Mono # 0.9      Eos # 0.0      Baso # 0.03      nRBC 0      Gran % 79.3 (*)     Lymph % 8.1 (*)     Mono % 11.4      Eosinophil % 0.2      Basophil % 0.4      Differential Method Automated      Narrative:     Release to patient->Immediate   COMPREHENSIVE METABOLIC PANEL - Abnormal    Sodium 133 (*)     Potassium 5.0      Chloride 101      CO2 22 (*)     Glucose 137 (*)     BUN 31 (*)     Creatinine 2.0 (*)     Calcium 8.5 (*)     Total Protein 6.5      Albumin 3.1 (*)     Total Bilirubin 0.2       Alkaline Phosphatase 108      AST 27      ALT 17      eGFR 26.1 (*)     Anion Gap 10      Narrative:     Release to patient->Immediate   C-REACTIVE PROTEIN - Abnormal    CRP 32.0 (*)     Narrative:     Release to patient->Immediate   CK - Abnormal     (*)     Narrative:     Release to patient->Immediate   PROCALCITONIN - Abnormal    Procalcitonin 0.43 (*)     Narrative:     Release to patient->Immediate   URINALYSIS, REFLEX TO URINE CULTURE - Abnormal    Specimen UA Urine, Clean Catch      Color, UA Yellow      Appearance, UA Clear      pH, UA 6.0      Specific Gravity, UA 1.010      Protein, UA 1+ (*)     Glucose, UA Negative      Ketones, UA Negative      Bilirubin (UA) Negative      Occult Blood UA 1+ (*)     Nitrite, UA Negative      Urobilinogen, UA Negative      Leukocytes, UA Negative      Narrative:     Specimen Source->Urine   TROPONIN I - Abnormal    Troponin I 0.034 (*)    SARS-COV-2 RDRP GENE - Abnormal    POC Rapid COVID Positive (*)      Acceptable Yes     CULTURE, BLOOD   CULTURE, BLOOD   HIV 1 / 2 ANTIBODY    HIV 1/2 Ag/Ab Negative      Narrative:     Release to patient->Immediate   HEPATITIS C ANTIBODY    Hepatitis C Ab Negative      Narrative:     Release to patient->Immediate   HEP C VIRUS HOLD SPECIMEN    HEP C Virus Hold Specimen Hold for HCV sendout      Narrative:     Release to patient->Immediate   LACTIC ACID, PLASMA    Lactate (Lactic Acid) 1.3     FERRITIN    Ferritin 91     LACTATE DEHYDROGENASE         URINALYSIS MICROSCOPIC    RBC, UA 1      WBC, UA 1      Bacteria Rare      Squam Epithel, UA 1      Hyaline Casts, UA 0      Microscopic Comment SEE COMMENT      Narrative:     Specimen Source->Urine          Imaging Results              X-Ray Chest AP Portable (Final result)  Result time 08/16/24 22:43:11      Final result by Jonas Allen MD (08/16/24 22:43:11)                   Impression:      No acute abnormality.  Stable findings.      Electronically  signed by: Jonas Allen  Date:    08/16/2024  Time:    22:43               Narrative:    EXAMINATION:  XR CHEST AP PORTABLE    CLINICAL HISTORY:  COVID-19;    TECHNIQUE:  Single frontal view of the chest was performed.    COMPARISON:  None    FINDINGS:  Blunting of the left costophrenic angle similar to prior exam may be related to cardiomegaly and epicardial fat pad.  Retrocardiac opacity likely related to atelectasis.  Otherwise no pleural effusion or pneumothorax.    Cardiomegaly.  The hilar and mediastinal contours are unremarkable.    Bones are intact.                                       X-Ray Pelvis Routine AP (Final result)  Result time 08/16/24 22:44:23      Final result by Jonas Allen MD (08/16/24 22:44:23)                   Impression:      No acute fracture or dislocation senescent changes.  Degenerative joint disease.      Electronically signed by: Jonas Allen  Date:    08/16/2024  Time:    22:44               Narrative:    EXAMINATION:  XR PELVIS ROUTINE AP    CLINICAL HISTORY:  fall;    TECHNIQUE:  AP view of the pelvis was performed.    COMPARISON:  None.    FINDINGS:  No evidence for pelvic fracture.  Mild degenerative joint disease.  Decreased bone mineral density.  Senescent changes                                       CT Head Without Contrast (Final result)  Result time 08/16/24 22:42:16      Final result by Jonas Allen MD (08/16/24 22:42:16)                   Impression:      No acute abnormality.    Atrophy and chronic white matter changes    All CT scans   are performed using dose optimization techniques including the following: automated exposure control; adjustment of the mA and/or kV; use of iterative reconstruction technique.  Dose modulation was employed for ALARA by means of: Automated exposure control; adjustment of the mA and/or kV according to patient size (this includes techniques or standardized protocols for targeted exams where dose is matched to indication/reason for  exam; i.e. extremities or head); and/or use of iterative reconstructive technique.      Electronically signed by: Jonas Harrelli  Date:    08/16/2024  Time:    22:42               Narrative:    EXAMINATION:  CT HEAD WITHOUT CONTRAST    CLINICAL HISTORY:  Head trauma, minor (Age >= 65y);Syncope, recurrent; Unspecified fall, initial encounter    TECHNIQUE:  Low dose axial CT images obtained throughout the head without intravenous contrast. Sagittal and coronal reconstructions were performed.    COMPARISON:  None.    FINDINGS:  Atrophy and chronic white matter changes.    . No extra-axial blood or fluid collections.    No parenchymal mass, hemorrhage, edema or major vascular distribution infarct.    Skull/extracranial contents (limited evaluation): No fracture. Mastoid air cells and paranasal sinuses are essentially clear.                                  6:03 AM Discussed lab/imaging studies with patient and the need for further evaluation/admission for fall, weakness, covid. Pt verbalized understanding that this is a stand alone ER and we are unable to admit at this facility. Pt will be transferred to [facility] via [facility] with care en route to include cm. I discussed this case with hs and care was accepted by Dr weber.       Medications   sodium chloride 0.9% bolus 500 mL 500 mL (0 mLs Intravenous Stopped 8/16/24 2251)   0.9%  NaCl infusion (0 mLs Intravenous Stopped 8/17/24 0200)     Medical Decision Making  DDx Covid, electrolyte abnormality, pneumonia    Problems Addressed:  JEN (acute kidney injury): acute illness or injury  COVID-19: acute illness or injury  Dehydration: acute illness or injury  Elevated troponin: acute illness or injury    Amount and/or Complexity of Data Reviewed  Labs: ordered.  Radiology: ordered.    Risk  Prescription drug management.  Decision regarding hospitalization.                                      Clinical Impression:  Final diagnoses:  [U07.1] COVID-19  [W19.XXXA]  Fall  [N17.9] JEN (acute kidney injury) (Primary)  [E86.0] Dehydration  [R79.89] Elevated troponin          ED Disposition Condition    Admit Stable                Arnold Haider MD  08/17/24 0603

## 2024-08-17 NOTE — ASSESSMENT & PLAN NOTE
-Troponin 0.034>>0.027. Elevated likely due to COVID and COPD. Serial troponin pending. She currently denies chest pain. TTE.

## 2024-08-17 NOTE — ASSESSMENT & PLAN NOTE
Patient with Hypercapnic Respiratory failure which is Acute on chronic.  she is on home oxygen at 2 LPM. Supplemental oxygen was provided and noted-      .   Signs/symptoms of respiratory failure include- tachypnea and wheezing. Contributing diagnoses includes - COPD and COVID  Labs and images were reviewed. Patient Has recent ABG, which has been reviewed. Will treat underlying causes and adjust management of respiratory failure as follows- IV steriods, scheduled DuoNebs, and Remdesivir.

## 2024-08-17 NOTE — ASSESSMENT & PLAN NOTE
Patient is identified as High risk for severe complications of COVID 19 based on COVID risk score of 6   Initiate standard COVID protocols; COVID-19 testing ,Infection Control notification  and isolation- respiratory, contact and droplet per protocol    Diagnostics: CBC, CMP, Procalcitonin, Ferritin, CRP, Troponin, and Portable CXR    Management: Initiate targeted therapy with Remdesivir, 200mg IV x1, followed by Solulmedrol 60 mg IV every 8 hours, Inhaled bronchodilators for shortness of breath, and Continuous cardiac monitoring. Continue supplemental oxygen at 2LPM.    Advance Care Planning  Current advance care plan has not been discussed with patient/family/POA and patient currently wishes Full Code.

## 2024-08-17 NOTE — HPI
Danielle Cortes is a 74 year old female with a PMHx of COPD, Tobacco abuse, Home oxygen use, OA, DM, HTN, Obesity, Thyroid disease, Spinal stenosis, and Glaucoma who presented to the [facility] ED with c/o generalized weakness. Associated symptoms include: fatigue, sneezing, nasal congestion. She was found down on floor this am from last night, unable to get up secondary to weakness. Daughter at bedside reports possible positive home COVID test today. ED workup: WBC count 8.01K, Hgb/Hct 10.4/34.5, Na+ 133, , creatinine 2.0, CRP 32, , lactic 1.3, procalcitonin 0.43, troponin 0.034, COVID positive. CT head and pelvis xray with no acute findings. CXR with no acute findings but showed cardiomegaly. PT admitted for generalized weakness r/t COVID.

## 2024-08-18 LAB
ALBUMIN SERPL BCP-MCNC: 2.7 G/DL (ref 3.5–5.2)
ALP SERPL-CCNC: 110 U/L (ref 55–135)
ALT SERPL W/O P-5'-P-CCNC: 16 U/L (ref 10–44)
ANION GAP SERPL CALC-SCNC: 13 MMOL/L (ref 8–16)
AORTIC ROOT ANNULUS: 2.81 CM
ASCENDING AORTA: 2.92 CM
AST SERPL-CCNC: 23 U/L (ref 10–40)
AV INDEX (PROSTH): 0.89
AV MEAN GRADIENT: 4 MMHG
AV PEAK GRADIENT: 6 MMHG
AV VALVE AREA BY VELOCITY RATIO: 3.31 CM²
AV VALVE AREA: 3.18 CM²
AV VELOCITY RATIO: 0.92
BASOPHILS # BLD AUTO: 0.01 K/UL (ref 0–0.2)
BASOPHILS NFR BLD: 0.2 % (ref 0–1.9)
BILIRUB SERPL-MCNC: 0.3 MG/DL (ref 0.1–1)
BSA FOR ECHO PROCEDURE: 2.38 M2
BUN SERPL-MCNC: 34 MG/DL (ref 8–23)
CALCIUM SERPL-MCNC: 8.1 MG/DL (ref 8.7–10.5)
CHLORIDE SERPL-SCNC: 101 MMOL/L (ref 95–110)
CO2 SERPL-SCNC: 19 MMOL/L (ref 23–29)
CREAT SERPL-MCNC: 1.8 MG/DL (ref 0.5–1.4)
CV ECHO LV RWT: 0.54 CM
DIFFERENTIAL METHOD BLD: ABNORMAL
DOP CALC AO PEAK VEL: 1.26 M/S
DOP CALC AO VTI: 35.9 CM
DOP CALC LVOT AREA: 3.6 CM2
DOP CALC LVOT DIAMETER: 2.14 CM
DOP CALC LVOT PEAK VEL: 1.16 M/S
DOP CALC LVOT STROKE VOLUME: 114.32 CM3
DOP CALCLVOT PEAK VEL VTI: 31.8 CM
E WAVE DECELERATION TIME: 396.83 MSEC
E/A RATIO: 0.78
E/E' RATIO: 18.46 M/S
ECHO LV POSTERIOR WALL: 1.45 CM (ref 0.6–1.1)
EOSINOPHIL # BLD AUTO: 0 K/UL (ref 0–0.5)
EOSINOPHIL NFR BLD: 0 % (ref 0–8)
ERYTHROCYTE [DISTWIDTH] IN BLOOD BY AUTOMATED COUNT: 13.3 % (ref 11.5–14.5)
EST. GFR  (NO RACE VARIABLE): 30 ML/MIN/1.73 M^2
FRACTIONAL SHORTENING: 39 % (ref 28–44)
GLUCOSE SERPL-MCNC: 283 MG/DL (ref 70–110)
HCT VFR BLD AUTO: 36 % (ref 37–48.5)
HGB BLD-MCNC: 11.1 G/DL (ref 12–16)
IMM GRANULOCYTES # BLD AUTO: 0.03 K/UL (ref 0–0.04)
IMM GRANULOCYTES NFR BLD AUTO: 0.7 % (ref 0–0.5)
INTERVENTRICULAR SEPTUM: 1.3 CM (ref 0.6–1.1)
IVC DIAMETER: 1.95 CM
IVRT: 48.53 MSEC
LA MAJOR: 6.9 CM
LA MINOR: 6.65 CM
LA WIDTH: 4.4 CM
LEFT ATRIUM SIZE: 3.63 CM
LEFT ATRIUM VOLUME INDEX: 41.4 ML/M2
LEFT ATRIUM VOLUME: 91.95 CM3
LEFT INTERNAL DIMENSION IN SYSTOLE: 3.25 CM (ref 2.1–4)
LEFT VENTRICLE DIASTOLIC VOLUME INDEX: 62.4 ML/M2
LEFT VENTRICLE DIASTOLIC VOLUME: 138.53 ML
LEFT VENTRICLE MASS INDEX: 142 G/M2
LEFT VENTRICLE SYSTOLIC VOLUME INDEX: 19.2 ML/M2
LEFT VENTRICLE SYSTOLIC VOLUME: 42.61 ML
LEFT VENTRICULAR INTERNAL DIMENSION IN DIASTOLE: 5.35 CM (ref 3.5–6)
LEFT VENTRICULAR MASS: 315.35 G
LV LATERAL E/E' RATIO: 20 M/S
LV SEPTAL E/E' RATIO: 17.14 M/S
LVED V (TEICH): 138.53 ML
LVES V (TEICH): 42.61 ML
LVOT MG: 3.38 MMHG
LVOT MV: 0.87 CM/S
LYMPHOCYTES # BLD AUTO: 0.5 K/UL (ref 1–4.8)
LYMPHOCYTES NFR BLD: 11.1 % (ref 18–48)
MCH RBC QN AUTO: 28 PG (ref 27–31)
MCHC RBC AUTO-ENTMCNC: 30.8 G/DL (ref 32–36)
MCV RBC AUTO: 91 FL (ref 82–98)
MONOCYTES # BLD AUTO: 0.1 K/UL (ref 0.3–1)
MONOCYTES NFR BLD: 2.2 % (ref 4–15)
MV PEAK A VEL: 1.53 M/S
MV PEAK E VEL: 1.2 M/S
NEUTROPHILS # BLD AUTO: 3.6 K/UL (ref 1.8–7.7)
NEUTROPHILS NFR BLD: 85.8 % (ref 38–73)
NRBC BLD-RTO: 0 /100 WBC
OHS CV RV/LV RATIO: 0.56 CM
PISA TR MAX VEL: 1.39 M/S
PLATELET # BLD AUTO: 216 K/UL (ref 150–450)
PMV BLD AUTO: 8.6 FL (ref 9.2–12.9)
POCT GLUCOSE: 278 MG/DL (ref 70–110)
POCT GLUCOSE: 336 MG/DL (ref 70–110)
POCT GLUCOSE: 375 MG/DL (ref 70–110)
POCT GLUCOSE: 424 MG/DL (ref 70–110)
POTASSIUM SERPL-SCNC: 5.1 MMOL/L (ref 3.5–5.1)
PROT SERPL-MCNC: 6.5 G/DL (ref 6–8.4)
PV PEAK GRADIENT: 6 MMHG
PV PEAK VELOCITY: 1.21 M/S
RA MAJOR: 5.76 CM
RA PRESSURE ESTIMATED: 15 MMHG
RA WIDTH: 3.2 CM
RBC # BLD AUTO: 3.96 M/UL (ref 4–5.4)
RIGHT VENTRICULAR END-DIASTOLIC DIMENSION: 3.02 CM
RV TB RVSP: 16 MMHG
RV TISSUE DOPPLER FREE WALL SYSTOLIC VELOCITY 1 (APICAL 4 CHAMBER VIEW): 13.96 CM/S
SODIUM SERPL-SCNC: 133 MMOL/L (ref 136–145)
STJ: 2.58 CM
TDI LATERAL: 0.06 M/S
TDI SEPTAL: 0.07 M/S
TDI: 0.07 M/S
TR MAX PG: 8 MMHG
TV REST PULMONARY ARTERY PRESSURE: 23 MMHG
WBC # BLD AUTO: 4.14 K/UL (ref 3.9–12.7)
Z-SCORE OF LEFT VENTRICULAR DIMENSION IN END DIASTOLE: -3.71
Z-SCORE OF LEFT VENTRICULAR DIMENSION IN END SYSTOLE: -2.92

## 2024-08-18 PROCEDURE — 63600175 PHARM REV CODE 636 W HCPCS: Performed by: NURSE PRACTITIONER

## 2024-08-18 PROCEDURE — 97110 THERAPEUTIC EXERCISES: CPT

## 2024-08-18 PROCEDURE — 27000221 HC OXYGEN, UP TO 24 HOURS

## 2024-08-18 PROCEDURE — 21400001 HC TELEMETRY ROOM

## 2024-08-18 PROCEDURE — S4991 NICOTINE PATCH NONLEGEND: HCPCS | Performed by: HOSPITALIST

## 2024-08-18 PROCEDURE — 94761 N-INVAS EAR/PLS OXIMETRY MLT: CPT

## 2024-08-18 PROCEDURE — 99900035 HC TECH TIME PER 15 MIN (STAT)

## 2024-08-18 PROCEDURE — 25000003 PHARM REV CODE 250: Performed by: HOSPITALIST

## 2024-08-18 PROCEDURE — 80053 COMPREHEN METABOLIC PANEL: CPT | Performed by: NURSE PRACTITIONER

## 2024-08-18 PROCEDURE — 85025 COMPLETE CBC W/AUTO DIFF WBC: CPT | Performed by: NURSE PRACTITIONER

## 2024-08-18 PROCEDURE — 63600175 PHARM REV CODE 636 W HCPCS: Mod: JZ,TB | Performed by: HOSPITALIST

## 2024-08-18 PROCEDURE — 36415 COLL VENOUS BLD VENIPUNCTURE: CPT | Performed by: NURSE PRACTITIONER

## 2024-08-18 PROCEDURE — 25000242 PHARM REV CODE 250 ALT 637 W/ HCPCS: Performed by: HOSPITALIST

## 2024-08-18 PROCEDURE — 25000003 PHARM REV CODE 250: Performed by: EMERGENCY MEDICINE

## 2024-08-18 PROCEDURE — 97163 PT EVAL HIGH COMPLEX 45 MIN: CPT

## 2024-08-18 PROCEDURE — 94640 AIRWAY INHALATION TREATMENT: CPT

## 2024-08-18 PROCEDURE — 27000207 HC ISOLATION

## 2024-08-18 RX ORDER — IBUPROFEN 200 MG
1 TABLET ORAL DAILY
Status: DISCONTINUED | OUTPATIENT
Start: 2024-08-18 | End: 2024-08-19 | Stop reason: HOSPADM

## 2024-08-18 RX ORDER — SODIUM CHLORIDE 9 MG/ML
INJECTION, SOLUTION INTRAVENOUS CONTINUOUS
Status: ACTIVE | OUTPATIENT
Start: 2024-08-18 | End: 2024-08-18

## 2024-08-18 RX ADMIN — IPRATROPIUM BROMIDE AND ALBUTEROL SULFATE 3 ML: 2.5; .5 SOLUTION RESPIRATORY (INHALATION) at 01:08

## 2024-08-18 RX ADMIN — LEVOTHYROXINE SODIUM 100 MCG: 0.03 TABLET ORAL at 06:08

## 2024-08-18 RX ADMIN — IPRATROPIUM BROMIDE AND ALBUTEROL SULFATE 3 ML: 2.5; .5 SOLUTION RESPIRATORY (INHALATION) at 08:08

## 2024-08-18 RX ADMIN — ENOXAPARIN SODIUM 135 MG: 150 INJECTION SUBCUTANEOUS at 09:08

## 2024-08-18 RX ADMIN — REMDESIVIR 100 MG: 100 INJECTION, POWDER, LYOPHILIZED, FOR SOLUTION INTRAVENOUS at 09:08

## 2024-08-18 RX ADMIN — IPRATROPIUM BROMIDE AND ALBUTEROL SULFATE 3 ML: 2.5; .5 SOLUTION RESPIRATORY (INHALATION) at 12:08

## 2024-08-18 RX ADMIN — NICOTINE 1 PATCH: 21 PATCH, EXTENDED RELEASE TRANSDERMAL at 12:08

## 2024-08-18 RX ADMIN — METHYLPREDNISOLONE SODIUM SUCCINATE 60 MG: 40 INJECTION, POWDER, FOR SOLUTION INTRAMUSCULAR; INTRAVENOUS at 09:08

## 2024-08-18 RX ADMIN — METHYLPREDNISOLONE SODIUM SUCCINATE 60 MG: 40 INJECTION, POWDER, FOR SOLUTION INTRAMUSCULAR; INTRAVENOUS at 12:08

## 2024-08-18 RX ADMIN — METHYLPREDNISOLONE SODIUM SUCCINATE 60 MG: 40 INJECTION, POWDER, FOR SOLUTION INTRAMUSCULAR; INTRAVENOUS at 05:08

## 2024-08-18 RX ADMIN — INSULIN ASPART 10 UNITS: 100 INJECTION, SOLUTION INTRAVENOUS; SUBCUTANEOUS at 06:08

## 2024-08-18 RX ADMIN — SODIUM CHLORIDE: 9 INJECTION, SOLUTION INTRAVENOUS at 12:08

## 2024-08-18 RX ADMIN — MUPIROCIN: 20 OINTMENT TOPICAL at 09:08

## 2024-08-18 RX ADMIN — IPRATROPIUM BROMIDE AND ALBUTEROL SULFATE 3 ML: 2.5; .5 SOLUTION RESPIRATORY (INHALATION) at 06:08

## 2024-08-18 RX ADMIN — INSULIN ASPART 10 UNITS: 100 INJECTION, SOLUTION INTRAVENOUS; SUBCUTANEOUS at 12:08

## 2024-08-18 RX ADMIN — INSULIN ASPART 5 UNITS: 100 INJECTION, SOLUTION INTRAVENOUS; SUBCUTANEOUS at 09:08

## 2024-08-18 RX ADMIN — THERA TABS 1 TABLET: TAB at 09:08

## 2024-08-18 RX ADMIN — ASPIRIN 81 MG: 81 TABLET, COATED ORAL at 09:08

## 2024-08-18 RX ADMIN — OXYCODONE HYDROCHLORIDE AND ACETAMINOPHEN 500 MG: 500 TABLET ORAL at 09:08

## 2024-08-18 NOTE — PT/OT/SLP EVAL
Physical Therapy Evaluation    Patient Name:  Danielle Cortes   MRN:  [id number]    Recommendations:     Discharge Recommendations: Low Intensity Therapy   Discharge Equipment Recommendations: none   Barriers to discharge: None    Assessment:     Danielle Cortes is a 74 y.o. female admitted with a medical diagnosis of COVID-19.  She presents with the following impairments/functional limitations: weakness, impaired endurance, impaired functional mobility, gait instability, pain Will benefit from PT to address limitations.    Rehab Prognosis: Fair; patient would benefit from acute skilled PT services to address these deficits and reach maximum level of function.    Recent Surgery: * No surgery found *      Plan:     During this hospitalization, patient to be seen 3 x/week (a min 3/wk as janak) to address the identified rehab impairments via gait training, therapeutic activities, therapeutic exercises, neuromuscular re-education and progress toward the following goals:    Plan of Care Expires:  09/01/24    Subjective     Chief Complaint: chronic LBP  Patient/Family Comments/goals: return home  Pain/Comfort:  Pain Rating 1: 10/10  Location 1: back (chronic)    Patients cultural, spiritual, Synagogue conflicts given the current situation:      Living Environment:  Pt lives with son with 24/7 caregiver assist. Daughter and granddaughter stop by daily to assist. Only amb with RW for short distances into bathroom. Mostly in chair/WC. Needs assist with all ADLS  Prior to admission, patients level of function was needs equipment and assist. Minimally mobile.  Equipment used at home: wheelchair, walker, rolling, bedside commode, shower chair.  DME owned (not currently used): none.  Upon discharge, patient will have assistance from family/caregivers.    Objective:     Communicated with [facility] prior to session.  Patient found supine with oxygen  upon PT entry to room.    General Precautions: Standard,    Orthopedic Precautions:    Braces:     Respiratory Status: Nasal cannula, flow 3 L/min    Exams:  RLE ROM: WFL  RLE Strength: Deficits: 4/5  LLE ROM: WFL  LLE Strength: Deficits: 4/5    Functional Mobility:  Bed Mobility:     Supine to Sit: minimum assistance  Transfers:     Bed to Chair: minimum assistance with  no AD  using  Squat Pivot  Gait: NT- due to pt pain and unwilling      AM-PAC 6 CLICK MOBILITY  Total Score:15       Treatment & Education:  Transferred to chair with min A. Began seated LE exercises- marching, LAQ, hip ABD/ADD and heel/toe raises 2x 10    Patient left up in chair with all lines intact, call button in reach, and daughter present.    GOALS:   Multidisciplinary Problems       Physical Therapy Goals          Problem: Physical Therapy    Goal Priority Disciplines Outcome Goal Variances Interventions   Physical Therapy Goal     PT, PT/OT      Description: The following goals will be met in 14 days  1. Pt IND with bed mobility  2. Pt transfer bed to chair with SPV  3. Pt amb 25 ft with RW and SBA                       History:     Past Medical History:   Diagnosis Date    Arthritis     Asthma     Cataracts, bilateral     COPD (chronic obstructive pulmonary disease)     Diabetes mellitus     Glaucoma     Hypertension     Lumbar herniated disc     Morbid obesity     Renal disorder     Spinal stenosis     Thyroid disease        Past Surgical History:   Procedure Laterality Date     SECTION      CHOLECYSTECTOMY      COLOSTOMY         Time Tracking:     PT Received On: 24  PT Start Time: 1315     PT Stop Time: 1345  PT Total Time (min): 30 min     Billable Minutes: Evaluation 15 and Therapeutic Exercise 10      2024

## 2024-08-18 NOTE — PLAN OF CARE
Problem: Adult Inpatient Plan of Care  Goal: Plan of Care Review  8/18/2024 1851 by Corbin Izaguirre LPN  Outcome: Progressing  8/18/2024 1850 by Corbin Izaguirre LPN  Outcome: Not Progressing  Goal: Patient-Specific Goal (Individualized)  8/18/2024 1851 by Corbin Izaguirre LPN  Outcome: Progressing  8/18/2024 1850 by Corbin Izaguirre LPN  Outcome: Not Progressing  Goal: Absence of Hospital-Acquired Illness or Injury  8/18/2024 1851 by Corbin Izaguirre LPN  Outcome: Progressing  8/18/2024 1850 by Corbin Izaguirre LPN  Outcome: Not Progressing  Goal: Optimal Comfort and Wellbeing  8/18/2024 1851 by Corbin Izaguirre LPN  Outcome: Progressing  8/18/2024 1850 by Corbin Izaguirre LPN  Outcome: Not Progressing  Goal: Readiness for Transition of Care  8/18/2024 1851 by Corbin Izaguirre LPN  Outcome: Progressing  8/18/2024 1850 by Corbin Izaguirre LPN  Outcome: Not Progressing

## 2024-08-18 NOTE — PLAN OF CARE
NAEON.   Aox4. Able to verbalize needs & follow commands.    POC reviewed with pt and daughter. Interventions implemented as appropriate.    VS stable.   NSR w/ 1AVB on tele-monitor, box # [id number].  On 2L o2 nc. Respiratory treatments as ordered.    20g PIV to rac. Flushed and saline locked. Dsg cdi. Integrity maintained.    Receiving Remdesivir. No adverse reactions noted.  Receiving IV steroids.    Skin wdi. No new skin issues.    1500 rahel ADA diet w/ 1.5L fluid restriction. Tolerating well. No n/v/d or any other GI complaints.  Colostomy to llq in place. No s/s of infection or skin breakdown noted.    No c/o pain.    Generalized weakness. Ambulates w/ walker. Activity ad zay.   CBG AC/HS; no coverage required.  Lovenox for VTE.    Frequent position changes encouraged. Able to reposition in bed independently. Educated on s/sx of pressure injury;  verbalized understanding.    NADN. Resting quietly in bed.   Free of falls. Hourly rounding complete.   All safety measures remain in place. SR up x2; bed low & locked. Bed alarm on and audible. Call light w/in reach.   Hourly monitoring continues throughout shift.  Daughter at bedside actively participating in poc.  Chart check complete.

## 2024-08-18 NOTE — PLAN OF CARE
[facility] - Telemetry (Hospital)  Initial Discharge Assessment       Primary Care Provider: Derek Frank MD    Admission Diagnosis: Dehydration [E86.0]  Fall [W19.XXXA]  Elevated troponin [R79.89]  JEN (acute kidney injury) [N17.9]  COVID-19 [U07.1]    Admission Date: 8/16/2024  Expected Discharge Date:     Transition of Care Barriers: (P) None    Payor: [employer] MEDICARE / Plan: [employer] MEDICARE HMO / Product Type: Capitation /     Extended Emergency Contact Information  Primary Emergency Contact: Karen Bell   [facility]  Home Phone: [phone]  Relation: Daughter    Discharge Plan A: (P) Home with family  Discharge Plan B: (P) Home Health      [facility] - [place], [place] - [address]  [address]  [place] [address]  Phone: [phone] Fax: [phone]      Initial Assessment (most recent)       Adult Discharge Assessment - 08/18/24 0949          Discharge Assessment    Assessment Type Discharge Planning Assessment     Confirmed/corrected address, phone number and insurance Yes (P)      Confirmed Demographics Correct on Facesheet (P)      Source of Information family     When was your last doctors appointment? 08/08/24     Communicated ARTURO with patient/caregiver Date not available/Unable to determine     Reason For Admission COVID and elevated troponin     People in Home child(lori), dependent     Do you expect to return to your current living situation? Yes     Do you have help at home or someone to help you manage your care at home? Yes     Who are your caregiver(s) and their phone number(s)? Jen Cuellar [phone]     Prior to hospitilization cognitive status: Alert/Oriented     Current cognitive status: Alert/Oriented     Walking or Climbing Stairs Difficulty yes     Walking or Climbing Stairs ambulation difficulty, requires equipment     Dressing/Bathing Difficulty yes     Dressing/Bathing bathing difficulty, assistance 1 person;bathing difficulty,  requires equipment     Home Accessibility wheelchair accessible     Home Layout Able to live on 1st floor     Equipment Currently Used at Home walker, rolling;wheelchair;shower chair;bedside commode;oxygen;bath bench;glucometer;nebulizer     Readmission within 30 days? No     Patient currently being followed by outpatient case management? No     Do you currently have service(s) that help you manage your care at home? No     Do you take prescription medications? Yes     Do you have prescription coverage? Yes     Coverage [employer] MANAGED MEDICARE     Do you have any problems affording any of your prescribed medications? No     Is the patient taking medications as prescribed? yes (P)      How do you get to doctors appointments? family or friend will provide (P)      Are you on dialysis? No (P)      Do you take coumadin? No (P)      Discharge Plan A Home with family (P)      Discharge Plan B Home Health (P)      Discharge Plan discussed with: Adult children (P)      Transition of Care Barriers None (P)         OTHER    Name(s) of People in Home Son who has down syndrome (P)                    NNAMDI spoke with patient's daughter Jen Cuellar [phone] who reported that she is patient's primary care taker. Patient lives alone with her son but since patient's son has down syndrome they have caretakers in the home 24/7. NNAMDI added patient's daughter number to her facesheet.     Mireille Richards LMSW 8/18/2024 10:04 AM

## 2024-08-19 VITALS
TEMPERATURE: 98 F | WEIGHT: 285.06 LBS | RESPIRATION RATE: 18 BRPM | HEART RATE: 68 BPM | HEIGHT: 62 IN | OXYGEN SATURATION: 99 % | DIASTOLIC BLOOD PRESSURE: 60 MMHG | BODY MASS INDEX: 52.46 KG/M2 | SYSTOLIC BLOOD PRESSURE: 134 MMHG

## 2024-08-19 DIAGNOSIS — U07.1 COVID-19 VIRUS DETECTED: ICD-10-CM

## 2024-08-19 PROBLEM — F17.200 TOBACCO DEPENDENCY: Status: RESOLVED | Noted: 2024-08-17 | Resolved: 2024-08-19

## 2024-08-19 PROBLEM — R79.89 ELEVATED TROPONIN: Status: RESOLVED | Noted: 2024-08-17 | Resolved: 2024-08-19

## 2024-08-19 LAB
ALBUMIN SERPL BCP-MCNC: 2.7 G/DL (ref 3.5–5.2)
ALP SERPL-CCNC: 106 U/L (ref 55–135)
ALT SERPL W/O P-5'-P-CCNC: 10 U/L (ref 10–44)
ANION GAP SERPL CALC-SCNC: 9 MMOL/L (ref 8–16)
AST SERPL-CCNC: 17 U/L (ref 10–40)
BASOPHILS # BLD AUTO: 0.01 K/UL (ref 0–0.2)
BASOPHILS NFR BLD: 0.1 % (ref 0–1.9)
BILIRUB SERPL-MCNC: 0.2 MG/DL (ref 0.1–1)
BUN SERPL-MCNC: 42 MG/DL (ref 8–23)
CALCIUM SERPL-MCNC: 8.4 MG/DL (ref 8.7–10.5)
CHLORIDE SERPL-SCNC: 100 MMOL/L (ref 95–110)
CO2 SERPL-SCNC: 23 MMOL/L (ref 23–29)
CREAT SERPL-MCNC: 1.7 MG/DL (ref 0.5–1.4)
DIFFERENTIAL METHOD BLD: ABNORMAL
EOSINOPHIL # BLD AUTO: 0 K/UL (ref 0–0.5)
EOSINOPHIL NFR BLD: 0 % (ref 0–8)
ERYTHROCYTE [DISTWIDTH] IN BLOOD BY AUTOMATED COUNT: 13.4 % (ref 11.5–14.5)
EST. GFR  (NO RACE VARIABLE): 32 ML/MIN/1.73 M^2
GLUCOSE SERPL-MCNC: 383 MG/DL (ref 70–110)
HCT VFR BLD AUTO: 35.3 % (ref 37–48.5)
HGB BLD-MCNC: 11.1 G/DL (ref 12–16)
IMM GRANULOCYTES # BLD AUTO: 0.04 K/UL (ref 0–0.04)
IMM GRANULOCYTES NFR BLD AUTO: 0.4 % (ref 0–0.5)
LYMPHOCYTES # BLD AUTO: 0.6 K/UL (ref 1–4.8)
LYMPHOCYTES NFR BLD: 6.4 % (ref 18–48)
MCH RBC QN AUTO: 28.3 PG (ref 27–31)
MCHC RBC AUTO-ENTMCNC: 31.4 G/DL (ref 32–36)
MCV RBC AUTO: 90 FL (ref 82–98)
MONOCYTES # BLD AUTO: 0.4 K/UL (ref 0.3–1)
MONOCYTES NFR BLD: 3.9 % (ref 4–15)
NEUTROPHILS # BLD AUTO: 8.1 K/UL (ref 1.8–7.7)
NEUTROPHILS NFR BLD: 89.2 % (ref 38–73)
NRBC BLD-RTO: 0 /100 WBC
PLATELET # BLD AUTO: 213 K/UL (ref 150–450)
PMV BLD AUTO: 8.8 FL (ref 9.2–12.9)
POCT GLUCOSE: 351 MG/DL (ref 70–110)
POCT GLUCOSE: 392 MG/DL (ref 70–110)
POTASSIUM SERPL-SCNC: 4.8 MMOL/L (ref 3.5–5.1)
PROT SERPL-MCNC: 6.5 G/DL (ref 6–8.4)
RBC # BLD AUTO: 3.92 M/UL (ref 4–5.4)
SODIUM SERPL-SCNC: 132 MMOL/L (ref 136–145)
WBC # BLD AUTO: 9.03 K/UL (ref 3.9–12.7)

## 2024-08-19 PROCEDURE — A9540 TC99M MAA: HCPCS | Performed by: EMERGENCY MEDICINE

## 2024-08-19 PROCEDURE — 27000221 HC OXYGEN, UP TO 24 HOURS

## 2024-08-19 PROCEDURE — 25000003 PHARM REV CODE 250: Performed by: EMERGENCY MEDICINE

## 2024-08-19 PROCEDURE — 63600175 PHARM REV CODE 636 W HCPCS: Performed by: NURSE PRACTITIONER

## 2024-08-19 PROCEDURE — 85025 COMPLETE CBC W/AUTO DIFF WBC: CPT | Performed by: HOSPITALIST

## 2024-08-19 PROCEDURE — 97530 THERAPEUTIC ACTIVITIES: CPT | Mod: CQ

## 2024-08-19 PROCEDURE — 94640 AIRWAY INHALATION TREATMENT: CPT

## 2024-08-19 PROCEDURE — 80053 COMPREHEN METABOLIC PANEL: CPT | Performed by: HOSPITALIST

## 2024-08-19 PROCEDURE — 25000003 PHARM REV CODE 250: Performed by: HOSPITALIST

## 2024-08-19 PROCEDURE — 94761 N-INVAS EAR/PLS OXIMETRY MLT: CPT

## 2024-08-19 PROCEDURE — S4991 NICOTINE PATCH NONLEGEND: HCPCS | Performed by: HOSPITALIST

## 2024-08-19 PROCEDURE — 25000242 PHARM REV CODE 250 ALT 637 W/ HCPCS: Performed by: HOSPITALIST

## 2024-08-19 PROCEDURE — 36415 COLL VENOUS BLD VENIPUNCTURE: CPT | Performed by: HOSPITALIST

## 2024-08-19 PROCEDURE — 63600175 PHARM REV CODE 636 W HCPCS: Performed by: EMERGENCY MEDICINE

## 2024-08-19 PROCEDURE — 63600175 PHARM REV CODE 636 W HCPCS: Mod: JZ,TB | Performed by: HOSPITALIST

## 2024-08-19 RX ORDER — PREDNISONE 20 MG/1
20 TABLET ORAL 2 TIMES DAILY
Status: DISCONTINUED | OUTPATIENT
Start: 2024-08-19 | End: 2024-08-19 | Stop reason: HOSPADM

## 2024-08-19 RX ORDER — CYANOCOBALAMIN 1000 UG/ML
1000 INJECTION, SOLUTION INTRAMUSCULAR; SUBCUTANEOUS DAILY
Status: DISCONTINUED | OUTPATIENT
Start: 2024-08-19 | End: 2024-08-19 | Stop reason: HOSPADM

## 2024-08-19 RX ORDER — PREDNISONE 20 MG/1
20 TABLET ORAL 2 TIMES DAILY
Qty: 10 TABLET | Refills: 0 | Status: SHIPPED | OUTPATIENT
Start: 2024-08-19

## 2024-08-19 RX ORDER — BENZONATATE 100 MG/1
200 CAPSULE ORAL 3 TIMES DAILY PRN
Qty: 30 CAPSULE | Refills: 1 | Status: SHIPPED | OUTPATIENT
Start: 2024-08-19 | End: 2024-08-29

## 2024-08-19 RX ORDER — IBUPROFEN 200 MG
1 TABLET ORAL DAILY
Qty: 28 PATCH | Refills: 1 | Status: SHIPPED | OUTPATIENT
Start: 2024-08-19

## 2024-08-19 RX ADMIN — NICOTINE 1 PATCH: 21 PATCH, EXTENDED RELEASE TRANSDERMAL at 08:08

## 2024-08-19 RX ADMIN — PREDNISONE 20 MG: 20 TABLET ORAL at 01:08

## 2024-08-19 RX ADMIN — ENOXAPARIN SODIUM 135 MG: 150 INJECTION SUBCUTANEOUS at 08:08

## 2024-08-19 RX ADMIN — IPRATROPIUM BROMIDE AND ALBUTEROL SULFATE 3 ML: 2.5; .5 SOLUTION RESPIRATORY (INHALATION) at 12:08

## 2024-08-19 RX ADMIN — INSULIN ASPART 10 UNITS: 100 INJECTION, SOLUTION INTRAVENOUS; SUBCUTANEOUS at 12:08

## 2024-08-19 RX ADMIN — LEVOTHYROXINE SODIUM 100 MCG: 0.03 TABLET ORAL at 05:08

## 2024-08-19 RX ADMIN — OXYCODONE HYDROCHLORIDE AND ACETAMINOPHEN 500 MG: 500 TABLET ORAL at 08:08

## 2024-08-19 RX ADMIN — ASPIRIN 81 MG: 81 TABLET, COATED ORAL at 08:08

## 2024-08-19 RX ADMIN — METHYLPREDNISOLONE SODIUM SUCCINATE 60 MG: 40 INJECTION, POWDER, FOR SOLUTION INTRAMUSCULAR; INTRAVENOUS at 12:08

## 2024-08-19 RX ADMIN — IPRATROPIUM BROMIDE AND ALBUTEROL SULFATE 3 ML: 2.5; .5 SOLUTION RESPIRATORY (INHALATION) at 08:08

## 2024-08-19 RX ADMIN — ACETAMINOPHEN 650 MG: 325 TABLET ORAL at 12:08

## 2024-08-19 RX ADMIN — REMDESIVIR 100 MG: 100 INJECTION, POWDER, LYOPHILIZED, FOR SOLUTION INTRAVENOUS at 08:08

## 2024-08-19 RX ADMIN — INSULIN ASPART 10 UNITS: 100 INJECTION, SOLUTION INTRAVENOUS; SUBCUTANEOUS at 05:08

## 2024-08-19 RX ADMIN — MUPIROCIN: 20 OINTMENT TOPICAL at 08:08

## 2024-08-19 RX ADMIN — METHYLPREDNISOLONE SODIUM SUCCINATE 60 MG: 40 INJECTION, POWDER, FOR SOLUTION INTRAMUSCULAR; INTRAVENOUS at 09:08

## 2024-08-19 RX ADMIN — KIT FOR THE PREPARATION OF TECHNETIUM TC 99M ALBUMIN AGGREGATED 5.2 MILLICURIE: 2.5 INJECTION, POWDER, FOR SOLUTION INTRAVENOUS at 09:08

## 2024-08-19 RX ADMIN — THERA TABS 1 TABLET: TAB at 08:08

## 2024-08-19 RX ADMIN — CYANOCOBALAMIN 1000 MCG: 1000 INJECTION INTRAMUSCULAR; SUBCUTANEOUS at 01:08

## 2024-08-19 NOTE — PT/OT/SLP PROGRESS
Physical Therapy Treatment    Patient Name:  Danielle Cortes   MRN:  [id number]    Recommendations:     Discharge Recommendations: Low Intensity Therapy  Discharge Equipment Recommendations: none  Barriers to discharge: None    Assessment:     Danielle Cortes is a 74 y.o. female admitted with a medical diagnosis of COVID-19.  She presents with the following impairments/functional limitations: weakness, impaired endurance, impaired functional mobility, gait instability, impaired self care skills, impaired balance, decreased safety awareness, decreased lower extremity function, impaired cardiopulmonary response to activity.    Pt presents with fair effort and participation secondary to impaired endurance with functional tasks. Pt requires minimum assistance with activities, and will continue to benefit from skilled therapy services to maximize independence, reduce burden of care, and to ensure safety.    Rehab Prognosis: Fair; patient would benefit from acute skilled PT services to address these deficits and reach maximum level of function.    Recent Surgery: * No surgery found *      Plan:     During this hospitalization, patient to be seen 3 x/week to address the identified rehab impairments via gait training, therapeutic activities, therapeutic exercises, neuromuscular re-education and progress toward the following goals:    Plan of Care Expires:  09/01/24    Subjective     Chief Complaint: none noted by pt  Patient/Family Comments/goals: to d/c home  Pain/Comfort:  Pain Rating 1: 0/10      Objective:     Communicated with pt's nurse, Qing, prior to session.  Patient found HOB elevated with oxygen, PureWick upon PT entry to room.     General Precautions: Standard, fall, airborne, contact, droplet  Orthopedic Precautions: N/A  Braces: N/A  Respiratory Status: Nasal cannula, flow 3 L/min     Functional Mobility:  Bed Mobility:     Scooting to plant feet on floor: stand by assistance  Supine to Sit: contact guard  assistance  Transfers:     Sit to Stand from EOB: contact guard assistance with rolling walker, 2 trials  Bed to Chair: minimum assistance with rolling walker using Stand Pivot  Gait: Pt ambulates ~20' throughout the patient room using the rolling walker with minimum assistance. Pt demonstrates unsteadiness with decreased step length and narrow ONEYDA. Pt presents with shortness of breath throughout the trial; educated on breathing techniques to reduce overexertion. Pt cued for walker proximity, direction, and pacing. Fair return demo noted.  Balance: Fair      AM-PAC 6 CLICK MOBILITY  Turning over in bed (including adjusting bedclothes, sheets and blankets)?: 3  Sitting down on and standing up from a chair with arms (e.g., wheelchair, bedside commode, etc.): 3  Moving from lying on back to sitting on the side of the bed?: 3  Moving to and from a bed to a chair (including a wheelchair)?: 3  Need to walk in hospital room?: 2  Climbing 3-5 steps with a railing?: 1  Basic Mobility Total Score: 15       Treatment & Education:  Pt educated on the role of therapy, the goals of the session, the importance of out of bed mobility, pacing/ breathing techniques and proper safety techniques to reduce the risk of fall/ injury. Pt demonstrates fair understanding.    Patient left up in chair, eating lunch with oxygen donned, call button in reach, pt's nurses notified, and pt's daughter present.    GOALS:   Multidisciplinary Problems       Physical Therapy Goals          Problem: Physical Therapy    Goal Priority Disciplines Outcome Goal Variances Interventions   Physical Therapy Goal     PT, PT/OT      Description: The following goals will be met in 14 days  1. Pt IND with bed mobility  2. Pt transfer bed to chair with SPV  3. Pt amb 25 ft with RW and SBA                       Time Tracking:     PT Received On: 08/19/24  PT Start Time: 1130     PT Stop Time: 1146  PT Total Time (min): 16 min     Billable Minutes: Therapeutic Activity  16    Treatment Type: Treatment  PT/PTA: PTA     Number of PTA visits since last PT visit: 1 08/19/2024

## 2024-08-19 NOTE — PLAN OF CARE
A227/A227 CHERYL Cortes is a 74 y.o.female admitted on 8/16/2024 for COVID-19   Code Status: DNR MRN: [id number]   Review of patient's allergies indicates:   Allergen Reactions    Adhesive     Hydromorphone     Meperidine     Phenobarbital     Tetracyclines      Past Medical History:   Diagnosis Date    Arthritis     Asthma     Cataracts, bilateral     COPD (chronic obstructive pulmonary disease)     Diabetes mellitus     Glaucoma     Hypertension     Lumbar herniated disc     Morbid obesity     Renal disorder     Spinal stenosis     Thyroid disease       PRN meds    acetaminophen, 650 mg, Q4H PRN  benzonatate, 100 mg, TID PRN  dextrose 10%, 12.5 g, PRN  dextrose 10%, 25 g, PRN  glucagon (human recombinant), 1 mg, PRN  glucose, 16 g, PRN  glucose, 16 g, PRN  glucose, 24 g, PRN  insulin aspart U-100, 0-10 Units, QID (AC + HS) PRN  melatonin, 6 mg, Nightly PRN  ondansetron, 4 mg, Q6H PRN  sodium chloride 0.9%, 10 mL, PRN      Discharge instructions received and reviewed with pt and family at bedside.  Pt voiced understanding and all questions answered to satisfaction.  Stressed importance to making and keeping all follow up appointments.  Medications sent to pt pharmacy and reviewed with pt.  Tele monitor removed and brought to monitor tech.  IV d/c'd with tip intact, pressure dressing applied.  Pt transported to front of hospital via w/c by PCT to be discharged home.       Orientation: oriented x 4  Laurent Coma Scale Score: 15     Lead Monitored: Lead II Rhythm: normal sinus rhythm    Cardiac/Telemetry Box Number: [id number]  VTE Required Core Measure: Pharmacological prophylaxis initiated/maintained Last Bowel Movement: 08/18/24  Diet diabetic 1500 Calorie; Fluid - 1500mL; Standard Tray  Diet diabetic  Diet Cardiac  Voiding Characteristics: external catheter  Dre Score: 18  Fall Risk Score: 13  Accucheck []   Freq?      Lines/Drains/Airways       Drain  Duration                  Colostomy LLQ -- days    Female  External Urinary Catheter w/ Suction 08/17/24 0200 2 days

## 2024-08-19 NOTE — SUBJECTIVE & OBJECTIVE
Review of Systems   All other systems reviewed and are negative.    Objective:     Vital Signs (Most Recent):  Temp: 98.3 °F (36.8 °C) (08/18/24 1957)  Pulse: 82 (08/18/24 2000)  Resp: 20 (08/18/24 1957)  BP: 133/63 (08/18/24 1957)  SpO2: 95 % (08/18/24 1957) Vital Signs (24h Range):  Temp:  [97.6 °F (36.4 °C)-98.7 °F (37.1 °C)] 98.3 °F (36.8 °C)  Pulse:  [62-82] 82  Resp:  [16-20] 20  SpO2:  [95 %-98 %] 95 %  BP: (117-149)/(56-65) 133/63     Weight: 129.3 kg (285 lb 0.9 oz)  Body mass index is 52.14 kg/m².    Intake/Output Summary (Last 24 hours) at 8/18/2024 2114  Last data filed at 8/18/2024 1850  Gross per 24 hour   Intake 410.69 ml   Output 400 ml   Net 10.69 ml         Physical Exam  Vitals and nursing note reviewed.   Constitutional:       General: She is not in acute distress.     Appearance: Normal appearance. She is normal weight. She is ill-appearing.   Cardiovascular:      Rate and Rhythm: Regular rhythm. Tachycardia present.      Heart sounds: No murmur heard.  Pulmonary:      Effort: Pulmonary effort is normal. No respiratory distress.      Breath sounds: Wheezing and rales present.      Comments: 2 L NC  Neurological:      General: No focal deficit present.      Mental Status: She is alert and oriented to person, place, and time.   Psychiatric:         Mood and Affect: Mood normal.         Behavior: Behavior normal.             Significant Labs: All pertinent labs within the past 24 hours have been reviewed.  Recent Lab Results  (Last 5 results in the past 24 hours)        08/18/24  1712   08/18/24  1214   08/18/24  1213   08/18/24  0628   08/18/24  0550        Albumin         2.7       ALP         110       ALT         16       Anion Gap         13       Ao root annulus   2.81             Ascending aorta   2.92             Ao peak jane   1.26             Ao VTI   35.90             AST         23       AV valve area   3.18             DANIELITO by Velocity Ratio   3.31             AV mean gradient   4              AV index (prosthetic)   0.89             AV peak gradient   6             AV Velocity Ratio   0.92             Baso #         0.01       Basophil %         0.2       BILIRUBIN TOTAL         0.3  Comment: For infants and newborns, interpretation of results should be based  on gestational age, weight and in agreement with clinical  observations.    Premature Infant recommended reference ranges:  Up to 24 hours.............<8.0 mg/dL  Up to 48 hours............<12.0 mg/dL  3-5 days..................<15.0 mg/dL  6-29 days.................<15.0 mg/dL         BSA   2.38             BUN         34       Calcium         8.1       Chloride         101       CO2         19       Creatinine         1.8       Left Ventricle Relative Wall Thickness   0.54             Differential Method         Automated       E/A ratio   0.78             E/E' ratio   18.46             eGFR         30       Eos #         0.0       Eos %         0.0       E wave deceleration time   396.83             FS   39             Glucose         283       Gran # (ANC)         3.6       Gran %         85.8       Hematocrit         36.0       Hemoglobin         11.1       Immature Grans (Abs)         0.03  Comment: Mild elevation in immature granulocytes is non specific and   can be seen in a variety of conditions including stress response,   acute inflammation, trauma and pregnancy. Correlation with other   laboratory and clinical findings is essential.         Immature Granulocytes         0.7       IVC diameter   1.95             IVRT   48.53             IVSd   1.30             LA WIDTH   4.4             Left Atrium Major Axis   6.90             Left Atrium Minor Axis   6.65             LA size   3.63             LA volume   91.95             LA vol index   41.4             LVOT area   3.6             LV LATERAL E/E' RATIO   20.00             LV SEPTAL E/E' RATIO   17.14             LV EDV BP   138.53             LV Diastolic Volume Index   62.40              Left Ventricular End Diastolic Volume by Teichholz Method   138.53             Left Ventricular End Systolic Volume by Teichholz Method   42.61             LVIDd   5.35             LVIDs   3.25             LV mass   315.35             LV Mass Index   142             Left Ventricular Outflow Tract Mean Gradient   3.38             Left Ventricular Outflow Tract Mean Velocity   0.87             LVOT diameter   2.14             LVOT peak masoud   1.16             LVOT stroke volume   114.32             LVOT peak VTI   31.80             LV ESV BP   42.61             LV Systolic Volume Index   19.2             Lymph #         0.5       Lymph %         11.1       MCH         28.0       MCHC         30.8       MCV         91       Mean e'   0.07             Mono #         0.1       Mono %         2.2       MPV         8.6       MV Peak A Masoud   1.53             MV Peak E Masoud   1.20             nRBC         0       Platelet Count         216       POCT Glucose 336     375   278         Potassium         5.1       PROTEIN TOTAL         6.5       PV peak gradient   6             PV PEAK VELOCITY   1.21             Posterior Wall   1.45             RA Major Axis   5.76             Est. RA pres   15             RA Width   3.2             RBC         3.96       RDW         13.3       RV S'   13.96             RV TB RVSP   16             RV/LV Ratio   0.56             RVDD   3.02             Sodium         133       STJ   2.58             TDI SEPTAL   0.07             TDI LATERAL   0.06             Triscuspid Valve Regurgitation Peak Gradient   8             TR Max Masoud   1.39             TV resting pulmonary artery pressure   23             WBC         4.14       ZLVIDD   -3.71             ZLVIDS   -2.92                                    Significant Imaging: I have reviewed all pertinent imaging results/findings within the past 24 hours.    X-Ray Chest AP Portable   Final Result      No acute abnormality.  Stable findings.          Electronically signed by: Jonas Allen   Date:    08/16/2024   Time:    22:43      X-Ray Pelvis Routine AP   Final Result      No acute fracture or dislocation senescent changes.  Degenerative joint disease.         Electronically signed by: Jonas Allen   Date:    08/16/2024   Time:    22:44      CT Head Without Contrast   Final Result      No acute abnormality.      Atrophy and chronic white matter changes      All CT scans   are performed using dose optimization techniques including the following: automated exposure control; adjustment of the mA and/or kV; use of iterative reconstruction technique.  Dose modulation was employed for ALARA by means of: Automated exposure control; adjustment of the mA and/or kV according to patient size (this includes techniques or standardized protocols for targeted exams where dose is matched to indication/reason for exam; i.e. extremities or head); and/or use of iterative reconstructive technique.         Electronically signed by: Jonas Allen   Date:    08/16/2024   Time:    22:42

## 2024-08-19 NOTE — ACP (ADVANCE CARE PLANNING)
Assisted attending with completing LaPOST for pt to reflect her wishes for DNR/Selective treatment. LaPOST completed with pt's daughter via [employer] and copy returned to pt's room for their records.    TICO Cooper, LCSW-BACS  Palliative Medicine  [phone]

## 2024-08-19 NOTE — CONSULTS
[facility] - Tele[facility] ([facility])  Wound Care    Patient Name:  Danielle Cortes   MRN:  [id number]  Date: 8/19/2024  Diagnosis: COVID-19    History:     Past Medical History:   Diagnosis Date    Arthritis     Asthma     Cataracts, bilateral     COPD (chronic obstructive pulmonary disease)     Diabetes mellitus     Glaucoma     Hypertension     Lumbar herniated disc     Morbid obesity     Renal disorder     Spinal stenosis     Thyroid disease        Social History     Socioeconomic History    Marital status:    Tobacco Use    Smoking status: Every Day     Current packs/day: 1.00     Types: Cigarettes    Smokeless tobacco: Never   Substance and Sexual Activity    Alcohol use: Yes    Drug use: No    Sexual activity: Never       Precautions:     Allergies as of 08/16/2024 - Reviewed 08/16/2024   Allergen Reaction Noted    Adhesive  02/17/2022    Hydromorphone  07/30/2015    Meperidine  07/30/2015    Phenobarbital  05/02/2023    Tetracyclines  07/30/2015       WOC Assessment Details/Treatment        08/19/24 0850   WOCN Assessment   WOCN Total Time (mins) 30   Visit Date 08/19/24   Visit Time 0850   Consult Type New   WOCN Speciality Ostomy   Ostomy Type Colostomy   Intervention assessed;chart review;coordination of care   Teaching on-going        Colostomy LLQ   No placement date or time found.   Present Prior to Hospital Arrival?: Yes  Location: LLQ   Wound Image    Stomal Appliance 1 piece;Dry;Intact;No Leakage   Peristomal Assessment Other (Comment)  (peristomal hernia noted)   Stoma Function stool;mushy;brown       Consulted due to presence of established ostomy.  Ms. Cortes is a 73 yo female patient admitted 8/16/24 w/ fever, progressive generalized weakness w/ positive CoVid-19 results.  PMH includes:  COPD, Tobacco abuse, Home oxygen use, OA, DM, HTN, Obesity, Thyroid disease, Spinal stenosis, and Glaucoma. R esting quietly in bed this morning, awake and alert w/ daughter @ bedside.  Daughter states patient has had  colostomy for a number of years; states she usually assists her mother w/ ostomy care.  States current pouch was just placed yesterday (states she usually changes pouch system every 2-3 days).  Noted small amount of mushy brown stool contained in pouch.  Daughter is concerned about bulge which has been noted around stoma for some time now; explained that this is a peristomal hernia and should not cause much concern.  Did discuss possibility of switching current pouching system to Flip pouch; plan to return w/ literature and product numbers---this may allow patient to get greater wear-time w/ pouches.  May also benefit from ostomy hernia belt---will also provide information on ordering belt to daughter as needed.  Did not disturb current pouch as no leakage detected @ this time.  Please re-consult for any further ostomy needs during hospital stay.  14:15 pm   returned to provide needed item numbers for Flip pouch ([employer] #[id number]) as well as Hernia belt ([employer] #[id number] for XL); however, patient has already been discharged to home.  I did request samples from [employer] sample program---pouch/accessories to be shipped free of charge to patient's home.      08/19/2024

## 2024-08-19 NOTE — PT/OT/SLP PROGRESS
Physical Therapy      Patient Name:  Danielle Cortes   MRN:  [id number]    Patient in transit off floor for imaging upon attempt for Physical Therapy treatment @0945. Will follow-up for progressive mobility as schedule allows.

## 2024-08-19 NOTE — PROGRESS NOTES
[facility] Medicine  Progress Note    Patient Name: Danielle Cortes  MRN: [id number]  Patient Class: IP- Inpatient   Admission Date: 8/16/2024  Length of Stay: 2 days  Attending Physician: Lori Garcia MD  Primary Care Provider: Derek Frank MD        Subjective:     Principal Problem:COVID-19        HPI:  Danielle Cortes is a 74 year old female with a PMHx of COPD, Tobacco abuse, Home oxygen use, OA, DM, HTN, Obesity, Thyroid disease, Spinal stenosis, and Glaucoma who presented to the [facility] ED with c/o generalized weakness. Associated symptoms include: fatigue, sneezing, nasal congestion. She was found down on floor this am from last night, unable to get up secondary to weakness. Daughter at bedside reports possible positive home COVID test today. ED workup: WBC count 8.01K, Hgb/Hct 10.4/34.5, Na+ 133, , creatinine 2.0, CRP 32, , lactic 1.3, procalcitonin 0.43, troponin 0.034, COVID positive. CT head and pelvis xray with no acute findings. CXR with no acute findings but showed cardiomegaly. PT admitted for generalized weakness r/t COVID.    Overview/Hospital Course:    8/18/24  NAEON, stable on 2 L NC  Patient reports SOB, coughing  V/Q scan ordered  Continue remdesivir   Family at bedside, updated      Review of Systems   All other systems reviewed and are negative.    Objective:     Vital Signs (Most Recent):  Temp: 98.3 °F (36.8 °C) (08/18/24 1957)  Pulse: 82 (08/18/24 2000)  Resp: 20 (08/18/24 1957)  BP: 133/63 (08/18/24 1957)  SpO2: 95 % (08/18/24 1957) Vital Signs (24h Range):  Temp:  [97.6 °F (36.4 °C)-98.7 °F (37.1 °C)] 98.3 °F (36.8 °C)  Pulse:  [62-82] 82  Resp:  [16-20] 20  SpO2:  [95 %-98 %] 95 %  BP: (117-149)/(56-65) 133/63     Weight: 129.3 kg (285 lb 0.9 oz)  Body mass index is 52.14 kg/m².    Intake/Output Summary (Last 24 hours) at 8/18/2024 2114  Last data filed at 8/18/2024 1850  Gross per 24 hour   Intake 410.69 ml   Output 400 ml   Net 10.69 ml          Physical Exam  Vitals and nursing note reviewed.   Constitutional:       General: She is not in acute distress.     Appearance: Normal appearance. She is normal weight. She is ill-appearing.   Cardiovascular:      Rate and Rhythm: Regular rhythm. Tachycardia present.      Heart sounds: No murmur heard.  Pulmonary:      Effort: Pulmonary effort is normal. No respiratory distress.      Breath sounds: Wheezing and rales present.      Comments: 2 L NC  Neurological:      General: No focal deficit present.      Mental Status: She is alert and oriented to person, place, and time.   Psychiatric:         Mood and Affect: Mood normal.         Behavior: Behavior normal.             Significant Labs: All pertinent labs within the past 24 hours have been reviewed.  Recent Lab Results  (Last 5 results in the past 24 hours)        08/18/24  1712   08/18/24  1214   08/18/24  1213   08/18/24  0628   08/18/24  0550        Albumin         2.7       ALP         110       ALT         16       Anion Gap         13       Ao root annulus   2.81             Ascending aorta   2.92             Ao peak jane   1.26             Ao VTI   35.90             AST         23       AV valve area   3.18             DANIELITO by Velocity Ratio   3.31             AV mean gradient   4             AV index (prosthetic)   0.89             AV peak gradient   6             AV Velocity Ratio   0.92             Baso #         0.01       Basophil %         0.2       BILIRUBIN TOTAL         0.3  Comment: For infants and newborns, interpretation of results should be based  on gestational age, weight and in agreement with clinical  observations.    Premature Infant recommended reference ranges:  Up to 24 hours.............<8.0 mg/dL  Up to 48 hours............<12.0 mg/dL  3-5 days..................<15.0 mg/dL  6-29 days.................<15.0 mg/dL         BSA   2.38             BUN         34       Calcium         8.1       Chloride         101       CO2         19        Creatinine         1.8       Left Ventricle Relative Wall Thickness   0.54             Differential Method         Automated       E/A ratio   0.78             E/E' ratio   18.46             eGFR         30       Eos #         0.0       Eos %         0.0       E wave deceleration time   396.83             FS   39             Glucose         283       Gran # (ANC)         3.6       Gran %         85.8       Hematocrit         36.0       Hemoglobin         11.1       Immature Grans (Abs)         0.03  Comment: Mild elevation in immature granulocytes is non specific and   can be seen in a variety of conditions including stress response,   acute inflammation, trauma and pregnancy. Correlation with other   laboratory and clinical findings is essential.         Immature Granulocytes         0.7       IVC diameter   1.95             IVRT   48.53             IVSd   1.30             LA WIDTH   4.4             Left Atrium Major Axis   6.90             Left Atrium Minor Axis   6.65             LA size   3.63             LA volume   91.95             LA vol index   41.4             LVOT area   3.6             LV LATERAL E/E' RATIO   20.00             LV SEPTAL E/E' RATIO   17.14             LV EDV BP   138.53             LV Diastolic Volume Index   62.40             Left Ventricular End Diastolic Volume by Teichholz Method   138.53             Left Ventricular End Systolic Volume by Teichholz Method   42.61             LVIDd   5.35             LVIDs   3.25             LV mass   315.35             LV Mass Index   142             Left Ventricular Outflow Tract Mean Gradient   3.38             Left Ventricular Outflow Tract Mean Velocity   0.87             LVOT diameter   2.14             LVOT peak jane   1.16             LVOT stroke volume   114.32             LVOT peak VTI   31.80             LV ESV BP   42.61             LV Systolic Volume Index   19.2             Lymph #         0.5       Lymph %         11.1       MCH          28.0       MCHC         30.8       MCV         91       Mean e'   0.07             Mono #         0.1       Mono %         2.2       MPV         8.6       MV Peak A Masoud   1.53             MV Peak E Masoud   1.20             nRBC         0       Platelet Count         216       POCT Glucose 336     375   278         Potassium         5.1       PROTEIN TOTAL         6.5       PV peak gradient   6             PV PEAK VELOCITY   1.21             Posterior Wall   1.45             RA Major Axis   5.76             Est. RA pres   15             RA Width   3.2             RBC         3.96       RDW         13.3       RV S'   13.96             RV TB RVSP   16             RV/LV Ratio   0.56             RVDD   3.02             Sodium         133       STJ   2.58             TDI SEPTAL   0.07             TDI LATERAL   0.06             Triscuspid Valve Regurgitation Peak Gradient   8             TR Max Masoud   1.39             TV resting pulmonary artery pressure   23             WBC         4.14       ZLVIDD   -3.71             ZLVIDS   -2.92                                    Significant Imaging: I have reviewed all pertinent imaging results/findings within the past 24 hours.    X-Ray Chest AP Portable   Final Result      No acute abnormality.  Stable findings.         Electronically signed by: Jonas Allen   Date:    08/16/2024   Time:    22:43      X-Ray Pelvis Routine AP   Final Result      No acute fracture or dislocation senescent changes.  Degenerative joint disease.         Electronically signed by: Jonas Allen   Date:    08/16/2024   Time:    22:44      CT Head Without Contrast   Final Result      No acute abnormality.      Atrophy and chronic white matter changes      All CT scans   are performed using dose optimization techniques including the following: automated exposure control; adjustment of the mA and/or kV; use of iterative reconstruction technique.  Dose modulation was employed for ALARA by means of: Automated  exposure control; adjustment of the mA and/or kV according to patient size (this includes techniques or standardized protocols for targeted exams where dose is matched to indication/reason for exam; i.e. extremities or head); and/or use of iterative reconstructive technique.         Electronically signed by: Jonas Allen   Date:    08/16/2024   Time:    22:42            Assessment/Plan:      * COVID-19  Patient is identified as High risk for severe complications of COVID 19 based on COVID risk score of 6   Initiate standard COVID protocols; COVID-19 testing ,Infection Control notification  and isolation- respiratory, contact and droplet per protocol    Diagnostics: CBC, CMP, Procalcitonin, Ferritin, CRP, Troponin, and Portable CXR    Management: Initiate targeted therapy with Remdesivir, 200mg IV x1, followed by Solulmedrol 60 mg IV every 8 hours, Inhaled bronchodilators for shortness of breath, and Continuous cardiac monitoring. Continue supplemental oxygen at 2LPM.    Advance Care Planning Current advance care plan has not been discussed with patient/family/POA and patient currently wishes Full Code.     COPD (chronic obstructive pulmonary disease)  Patient's COPD is uncontrolled currently. Likely r/t COVID. Patient is currently off COPD Pathway. Continue scheduled inhalers  continue home oxygen at 2LPM, Steroids, and Antibiotics and monitor respiratory status closely.     Chronic respiratory failure  Patient with Hypercapnic Respiratory failure which is Acute on chronic.  she is on home oxygen at 2 LPM. Supplemental oxygen was provided and noted-      .   Signs/symptoms of respiratory failure include- tachypnea and wheezing. Contributing diagnoses includes - COPD and COVID  Labs and images were reviewed. Patient Has recent ABG, which has been reviewed. Will treat underlying causes and adjust management of respiratory failure as follows- IV steriods, scheduled DuoNebs, and Remdesivir.    Diabetes mellitus, type  2  Patient's FSGs are controlled on current medication regimen.  Last A1c reviewed-   Lab Results   Component Value Date    HGBA1C 8.4 (H) 08/17/2024     Most recent fingerstick glucose reviewed-   Recent Labs   Lab 08/17/24  0811 08/17/24  1608   POCTGLUCOSE 71 86     Current correctional scale  Moderate  Maintain anti-hyperglycemic dose as follows-   Antihyperglycemics (From admission, onward)      Start     Stop Route Frequency Ordered    08/17/24 1710  insulin aspart U-100 pen 0-10 Units  (Insulin - Moderate Correction Dose (recommended Total Daily Dose > 30 units))         -- SubQ Before meals & nightly PRN 08/17/24 1612          Hold Oral hypoglycemics while patient is in the hospital.    Adult hypothyroidism  Lab Results   Component Value Date    TSH 1.380 10/03/2023    TSH 1.700 06/08/2021      -Continue Levothyroxine    Tobacco dependency  Dangers of cigarette smoking were reviewed with patient in detail. Patient was Counseled for 3-10 minutes. Nicotine replacement options were discussed. Nicotine replacement was discussed- prescribed    Extreme obesity  Body mass index is 52.13 kg/m². Morbid obesity complicates all aspects of disease management from diagnostic modalities to treatment. Weight loss encouraged and health benefits explained to patient.         Elevated troponin  -Troponin 0.034>>0.027. Elevated likely due to COVID and COPD. Serial troponin pending. She currently denies chest pain. TTE.         VTE Risk Mitigation (From admission, onward)           Ordered     enoxaparin injection 135 mg  2 times daily         08/17/24 1612                    Discharge Planning   ARTURO:      Code Status: Full Code   Is the patient medically ready for discharge?:     Reason for patient still in hospital (select all that apply): Patient trending condition, Laboratory test, and Treatment  Discharge Plan A: Home with family                  Gabe Mills MD  Department of Hospital Medicine   [facility] Tele[facility]  ([facility])

## 2024-08-19 NOTE — HOSPITAL COURSE
8/18/24  NAEON, stable on 2 L NC  Patient reports SOB, coughing  V/Q scan ordered  Continue remdesivir   Family at bedside, updated    8/19- pt seen and examined, chart, imaging reviewed, elderly lady admitted with gen weakness sec to Covid- treated with Remdesivir and steroids. Looks and feels much better, cough, SOB much better. She is sitting up and walking around comforatbly. Daughter at her side and requests discharge home today. VSS, Afeb, sats 98% on 2 L NC. She is already on Home O2. Colostomy working well. She is DNR per family, [place] signed. She is eating drinking well, walking around well. She was seen and examined and deemed stable for discharge home today.

## 2024-08-19 NOTE — PLAN OF CARE
Pt AAOx4. VSS. Pt remained free of falls this shift. Family at the bedside. Pt complained of generalized pain, pt requested for tylenol. Medications administered as ordered. Pt is SR/SB  on monitor. Pt instructed to call for assistance. POC reviewed. Pt verbalized understanding.

## 2024-08-19 NOTE — PLAN OF CARE
[facility] Tele[facility] (Hospital)  Discharge Final Note    Primary Care Provider: Derek Frank MD    Expected Discharge Date: 8/19/2024    Final Discharge Note (most recent)       Final Note - 08/19/24 1419          Final Note    Assessment Type Final Discharge Note     Anticipated Discharge Disposition Home or Self Care        Post-Acute Status    Coverage [employer]     Discharge Delays None known at this time                     Important Message from Medicare  Important Message from Medicare regarding Discharge Appeal Rights: Given to patient/caregiver, Explained to patient/caregiver, Signed/date by patient/caregiver     Date IMM was signed: 08/19/24  Time IMM was signed: 0811    Contact Info       Derek Frank MD   Specialty: Internal Medicine   Relationship: PCP - General    [facility]sidi[facility]iliates  [address] [place] [id number]   Phone: [phone]       Next Steps: Schedule an appointment as soon as possible for a visit in 3 day(s)    Instructions: Hospital follow up, As needed          DC Disposition: home with family  Family Notified: Patient at bedside  Transportation: personal transportation    Patient had no equipment or placement needs from .     Patient has resources to [facility]

## 2024-08-22 LAB
BACTERIA BLD CULT: NORMAL
BACTERIA BLD CULT: NORMAL

## 2024-08-22 NOTE — DISCHARGE SUMMARY
[facility] ([facility]  Discharge Summary      Patient Name: Danielle Cortes  MRN: [id number]  ERROL: [id number]  Patient Class: IP- Inpatient  Admission Date: 8/16/2024  Hospital Length of Stay: 3 days  Discharge Date and Time: 8/19/2024  2:19 PM  Attending Physician: No att. providers found   Discharging Provider: Carolann Tiwari MD  Primary Care Provider: Derek Frank MD    Primary Care Team: [facility] MEDICINE D    HPI:   Danielle Cortes is a 74 year old female with a PMHx of COPD, Tobacco abuse, Home oxygen use, OA, DM, HTN, Obesity, Thyroid disease, Spinal stenosis, and Glaucoma who presented to the [facility] ED with c/o generalized weakness. Associated symptoms include: fatigue, sneezing, nasal congestion. She was found down on floor this am from last night, unable to get up secondary to weakness. Daughter at bedside reports possible positive home COVID test today. ED workup: WBC count 8.01K, Hgb/Hct 10.4/34.5, Na+ 133, , creatinine 2.0, CRP 32, , lactic 1.3, procalcitonin 0.43, troponin 0.034, COVID positive. CT head and pelvis xray with no acute findings. CXR with no acute findings but showed cardiomegaly. PT admitted for generalized weakness r/t COVID.    * No surgery found *      Hospital Course:     8/18/24  NAEON, stable on 2 L NC  Patient reports SOB, coughing  V/Q scan ordered  Continue remdesivir   Family at bedside, updated    8/19- pt seen and examined, chart, imaging reviewed, elderly lady admitted with gen weakness sec to Covid- treated with Remdesivir and steroids. Looks and feels much better, cough, SOB much better. She is sitting up and walking around comforatbly. Daughter at her side and requests discharge home today. VSS, Afeb, sats 98% on 2 L NC. She is already on Home O2. Colostomy working well. She is DNR per family, LaPost signed. She is eating drinking well, walking around well. She was seen and examined and deemed stable for discharge home today.     Goals  of Care Treatment Preferences:  Code Status: DNR       LaPOST: Yes              Consults:     No new Assessment & Plan notes have been filed under this hospital service since the last note was generated.  Service: Hospital Medicine    Final Active Diagnoses:    Diagnosis Date Noted POA    COPD (chronic obstructive pulmonary disease) [J44.9] 08/17/2024 Yes     Chronic    Chronic respiratory failure [J96.10] 08/17/2024 Yes    Adult hypothyroidism [E03.9] 01/02/2014 Yes    Diabetes mellitus, type 2 [E11.9] 01/02/2014 Yes    Extreme obesity [E66.8] 06/07/2012 Yes      Problems Resolved During this Admission:    Diagnosis Date Noted Date Resolved POA    PRINCIPAL PROBLEM:  COVID-19 [U07.1] 08/17/2024 08/19/2024 Yes    Tobacco dependency [F17.200] 08/17/2024 08/19/2024 Yes    Elevated troponin [R79.89] 08/17/2024 08/19/2024 Yes       Discharged Condition: stable    Disposition: Home or Self Care    Follow Up:   Follow-up Information       Derek Frank MD. Schedule an appointment as soon as possible for a visit in 3 day(s).    Specialty: Internal Medicine  Why: Hospital follow up, As needed  Contact information:  [address] [facility] [address] [phone]                           Patient Instructions:      Diet diabetic     Diet Cardiac     Dnr (do not resuscitate)     Reason for not Ordering Smoking Cessation Referral     Order Specific Question Answer Comments   Reason for not ordering: Patient refused      Reason for not Prescribing Nicotine Replacement     Order Specific Question Answer Comments   Reason for not Prescribing: Patient refused      Activity as tolerated       Significant Diagnostic Studies: Labs: All labs within the past 24 hours have been reviewed  Microbiology: Blood Culture   Lab Results   Component Value Date    LABBLOO No growth after 5 days. 08/16/2024    and Sputum Culture   Radiology:   Imaging Results              X-Ray Chest AP Portable (Final result)  Result time  08/16/24 22:43:11      Final result by Jonas Allen MD (08/16/24 22:43:11)                   Impression:      No acute abnormality.  Stable findings.      Electronically signed by: Jonas Allen  Date:    08/16/2024  Time:    22:43               Narrative:    EXAMINATION:  XR CHEST AP PORTABLE    CLINICAL HISTORY:  COVID-19;    TECHNIQUE:  Single frontal view of the chest was performed.    COMPARISON:  None    FINDINGS:  Blunting of the left costophrenic angle similar to prior exam may be related to cardiomegaly and epicardial fat pad.  Retrocardiac opacity likely related to atelectasis.  Otherwise no pleural effusion or pneumothorax.    Cardiomegaly.  The hilar and mediastinal contours are unremarkable.    Bones are intact.                                       X-Ray Pelvis Routine AP (Final result)  Result time 08/16/24 22:44:23      Final result by Jonas Allen MD (08/16/24 22:44:23)                   Impression:      No acute fracture or dislocation senescent changes.  Degenerative joint disease.      Electronically signed by: Jonsa Allen  Date:    08/16/2024  Time:    22:44               Narrative:    EXAMINATION:  XR PELVIS ROUTINE AP    CLINICAL HISTORY:  fall;    TECHNIQUE:  AP view of the pelvis was performed.    COMPARISON:  None.    FINDINGS:  No evidence for pelvic fracture.  Mild degenerative joint disease.  Decreased bone mineral density.  Senescent changes                                       CT Head Without Contrast (Final result)  Result time 08/16/24 22:42:16      Final result by Jonas Allen MD (08/16/24 22:42:16)                   Impression:      No acute abnormality.    Atrophy and chronic white matter changes    All CT scans   are performed using dose optimization techniques including the following: automated exposure control; adjustment of the mA and/or kV; use of iterative reconstruction technique.  Dose modulation was employed for ALARA by means of: Automated exposure control;  "adjustment of the mA and/or kV according to patient size (this includes techniques or standardized protocols for targeted exams where dose is matched to indication/reason for exam; i.e. extremities or head); and/or use of iterative reconstructive technique.      Electronically signed by: Jonas Allen  Date:    08/16/2024  Time:    22:42               Narrative:    EXAMINATION:  CT HEAD WITHOUT CONTRAST    CLINICAL HISTORY:  Head trauma, minor (Age >= 65y);Syncope, recurrent; Unspecified fall, initial encounter    TECHNIQUE:  Low dose axial CT images obtained throughout the head without intravenous contrast. Sagittal and coronal reconstructions were performed.    COMPARISON:  None.    FINDINGS:  Atrophy and chronic white matter changes.    . No extra-axial blood or fluid collections.    No parenchymal mass, hemorrhage, edema or major vascular distribution infarct.    Skull/extracranial contents (limited evaluation): No fracture. Mastoid air cells and paranasal sinuses are essentially clear.                                       Pending Diagnostic Studies:       None           Medications:  Reconciled Home Medications:      Medication List        START taking these medications      benzonatate 100 MG capsule  Commonly known as: TESSALON  Take 2 capsules (200 mg total) by mouth 3 (three) times daily as needed for Cough.     predniSONE 20 MG tablet  Commonly known as: DELTASONE  Take 1 tablet (20 mg total) by mouth 2 (two) times daily.     pulse oximeter device  Commonly known as: pulse oximeter  by Apply Externally route 2 (two) times a day. Use twice daily at 8 AM and 3 PM and [facility].            CONTINUE taking these medications      aspirin 81 MG EC tablet  Commonly known as: ECOTRIN  Take 81 mg by mouth once daily.     BD ULTRA-FINE MINI PEN NEEDLE 31 gauge x 3/16" Ndle  Generic drug: pen needle, diabetic  For use up to 5 times a day     furosemide 40 MG tablet  Commonly known as: " LASIX  Take 40 mg by mouth 2 (two) times daily.     lancets Misc  Use as directed to test sugar reading 3 times a day     LANTUS SOLOSTAR U-100 INSULIN 100 unit/mL (3 mL) Inpn pen  Generic drug: insulin glargine U-100 (Lantus)  Inject 60 Units into the skin 2 (two) times daily. 60 units     LIDODERM 5 %  Generic drug: LIDOcaine  Place 1 patch onto the skin daily as needed.     NovoLOG Flexpen U-100 Insulin 100 unit/mL (3 mL) Inpn pen  Generic drug: insulin aspart U-100  once daily.     NYSTOP powder  Generic drug: nystatin  continuous prn.     PROAIR HFA 90 mcg/actuation inhaler  Generic drug: albuterol  Inhale 2 puffs into the lungs.     promethazine 25 MG tablet  Commonly known as: PHENERGAN  Take 25 mg by mouth every 6 (six) hours as needed.     SYMBICORT 160-4.5 mcg/actuation Hfaa  Generic drug: budesonide-formoterol 160-4.5 mcg  Inhale 2 puffs into the lungs 2 (two) times daily.     SYNTHROID 100 MCG tablet  Generic drug: levothyroxine  Take 100 mcg by mouth once daily.            STOP taking these medications      amoxicillin-clavulanate 875-125mg 875-125 mg per tablet  Commonly known as: AUGMENTIN     arformoteroL 15 mcg/2 mL Nebu  Commonly known as: BROVANA     brimonidine 0.15 % OPTH DROP 0.15 % ophthalmic solution  Commonly known as: ALPHAGAN     FREESTYLE LITE STRIPS Strp  Generic drug: blood sugar diagnostic     HYDROcodone-acetaminophen 5-325 mg per tablet  Commonly known as: NORCO     NEURONTIN 400 MG capsule  Generic drug: gabapentin     timolol maleate 0.5% 0.5 % Drop  Commonly known as: TIMOPTIC     triamcinolone acetonide 0.025% 0.025 % Oint  Commonly known as: KENALOG              Indwelling Lines/Drains at time of discharge:   Lines/Drains/Airways       Drain  Duration                  Colostomy LLQ -- days                    Time spent on the discharge of patient: 45 minutes         Carolann Tiwari MD  Department of Hospital Medicine  [facility] ([facility])